# Patient Record
Sex: FEMALE | Race: BLACK OR AFRICAN AMERICAN | NOT HISPANIC OR LATINO | Employment: FULL TIME | URBAN - METROPOLITAN AREA
[De-identification: names, ages, dates, MRNs, and addresses within clinical notes are randomized per-mention and may not be internally consistent; named-entity substitution may affect disease eponyms.]

---

## 2018-02-22 ENCOUNTER — HOSPITAL ENCOUNTER (EMERGENCY)
Facility: HOSPITAL | Age: 33
Discharge: HOME | End: 2018-02-22
Attending: EMERGENCY MEDICINE | Admitting: EMERGENCY MEDICINE
Payer: COMMERCIAL

## 2018-02-22 VITALS
DIASTOLIC BLOOD PRESSURE: 71 MMHG | HEART RATE: 89 BPM | OXYGEN SATURATION: 99 % | WEIGHT: 138 LBS | SYSTOLIC BLOOD PRESSURE: 116 MMHG | TEMPERATURE: 98.7 F | RESPIRATION RATE: 16 BRPM

## 2018-02-22 DIAGNOSIS — R42 EPISODIC LIGHTHEADEDNESS: ICD-10-CM

## 2018-02-22 DIAGNOSIS — R09.81 SINUS CONGESTION: ICD-10-CM

## 2018-02-22 DIAGNOSIS — R11.0 NAUSEA: Primary | ICD-10-CM

## 2018-02-22 LAB
ALBUMIN SERPL BCP-MCNC: 3.5 G/DL (ref 3.5–5)
ALP SERPL-CCNC: 47 U/L (ref 46–116)
ALT SERPL W P-5'-P-CCNC: 20 U/L (ref 12–78)
ANION GAP SERPL CALCULATED.3IONS-SCNC: 9 MMOL/L (ref 4–13)
AST SERPL W P-5'-P-CCNC: 17 U/L (ref 5–45)
BACTERIA UR QL AUTO: ABNORMAL /HPF
BASOPHILS # BLD AUTO: 0.02 THOUSANDS/ΜL (ref 0–0.1)
BASOPHILS NFR BLD AUTO: 1 % (ref 0–1)
BILIRUB DIRECT SERPL-MCNC: 0.1 MG/DL (ref 0–0.2)
BILIRUB SERPL-MCNC: 0.32 MG/DL (ref 0.2–1)
BILIRUB UR QL STRIP: NEGATIVE
BUN SERPL-MCNC: 9 MG/DL (ref 5–25)
CALCIUM SERPL-MCNC: 8.3 MG/DL (ref 8.3–10.1)
CHLORIDE SERPL-SCNC: 104 MMOL/L (ref 100–108)
CLARITY UR: CLEAR
CO2 SERPL-SCNC: 27 MMOL/L (ref 21–32)
COLOR UR: YELLOW
CREAT SERPL-MCNC: 0.76 MG/DL (ref 0.6–1.3)
EOSINOPHIL # BLD AUTO: 0.01 THOUSAND/ΜL (ref 0–0.61)
EOSINOPHIL NFR BLD AUTO: 0 % (ref 0–6)
ERYTHROCYTE [DISTWIDTH] IN BLOOD BY AUTOMATED COUNT: 13.3 % (ref 11.6–15.1)
EXT PREG TEST URINE: NORMAL
GFR SERPL CREATININE-BSD FRML MDRD: 122 ML/MIN/1.73SQ M
GLUCOSE SERPL-MCNC: 71 MG/DL (ref 65–140)
GLUCOSE UR STRIP-MCNC: NEGATIVE MG/DL
HCT VFR BLD AUTO: 33.8 % (ref 34.8–46.1)
HGB BLD-MCNC: 11.6 G/DL (ref 11.5–15.4)
HGB UR QL STRIP.AUTO: ABNORMAL
KETONES UR STRIP-MCNC: NEGATIVE MG/DL
LEUKOCYTE ESTERASE UR QL STRIP: ABNORMAL
LYMPHOCYTES # BLD AUTO: 1.45 THOUSANDS/ΜL (ref 0.6–4.47)
LYMPHOCYTES NFR BLD AUTO: 52 % (ref 14–44)
MCH RBC QN AUTO: 28.2 PG (ref 26.8–34.3)
MCHC RBC AUTO-ENTMCNC: 34.3 G/DL (ref 31.4–37.4)
MCV RBC AUTO: 82 FL (ref 82–98)
MONOCYTES # BLD AUTO: 0.41 THOUSAND/ΜL (ref 0.17–1.22)
MONOCYTES NFR BLD AUTO: 15 % (ref 4–12)
NEUTROPHILS # BLD AUTO: 0.88 THOUSANDS/ΜL (ref 1.85–7.62)
NEUTS SEG NFR BLD AUTO: 32 % (ref 43–75)
NITRITE UR QL STRIP: NEGATIVE
NON-SQ EPI CELLS URNS QL MICRO: ABNORMAL /HPF
NRBC BLD AUTO-RTO: 0 /100 WBCS
PH UR STRIP.AUTO: 7 [PH] (ref 4.5–8)
PLATELET # BLD AUTO: 172 THOUSANDS/UL (ref 149–390)
PMV BLD AUTO: 10.1 FL (ref 8.9–12.7)
POTASSIUM SERPL-SCNC: 3.4 MMOL/L (ref 3.5–5.3)
PROT SERPL-MCNC: 7.2 G/DL (ref 6.4–8.2)
PROT UR STRIP-MCNC: NEGATIVE MG/DL
RBC # BLD AUTO: 4.12 MILLION/UL (ref 3.81–5.12)
RBC #/AREA URNS AUTO: ABNORMAL /HPF
SODIUM SERPL-SCNC: 140 MMOL/L (ref 136–145)
SP GR UR STRIP.AUTO: 1.02 (ref 1–1.03)
UROBILINOGEN UR QL STRIP.AUTO: 1 E.U./DL
WBC # BLD AUTO: 2.77 THOUSAND/UL (ref 4.31–10.16)
WBC #/AREA URNS AUTO: ABNORMAL /HPF

## 2018-02-22 PROCEDURE — 80076 HEPATIC FUNCTION PANEL: CPT | Performed by: EMERGENCY MEDICINE

## 2018-02-22 PROCEDURE — 36415 COLL VENOUS BLD VENIPUNCTURE: CPT | Performed by: EMERGENCY MEDICINE

## 2018-02-22 PROCEDURE — 81002 URINALYSIS NONAUTO W/O SCOPE: CPT | Performed by: EMERGENCY MEDICINE

## 2018-02-22 PROCEDURE — 96361 HYDRATE IV INFUSION ADD-ON: CPT

## 2018-02-22 PROCEDURE — 99284 EMERGENCY DEPT VISIT MOD MDM: CPT

## 2018-02-22 PROCEDURE — 81001 URINALYSIS AUTO W/SCOPE: CPT

## 2018-02-22 PROCEDURE — 85025 COMPLETE CBC W/AUTO DIFF WBC: CPT | Performed by: EMERGENCY MEDICINE

## 2018-02-22 PROCEDURE — 81025 URINE PREGNANCY TEST: CPT | Performed by: EMERGENCY MEDICINE

## 2018-02-22 PROCEDURE — 80048 BASIC METABOLIC PNL TOTAL CA: CPT | Performed by: EMERGENCY MEDICINE

## 2018-02-22 PROCEDURE — 96374 THER/PROPH/DIAG INJ IV PUSH: CPT

## 2018-02-22 RX ORDER — OXYMETAZOLINE HYDROCHLORIDE 0.05 G/100ML
2 SPRAY NASAL ONCE
Status: COMPLETED | OUTPATIENT
Start: 2018-02-22 | End: 2018-02-22

## 2018-02-22 RX ORDER — ONDANSETRON 4 MG/1
4 TABLET, ORALLY DISINTEGRATING ORAL EVERY 6 HOURS PRN
Qty: 20 TABLET | Refills: 0 | Status: SHIPPED | OUTPATIENT
Start: 2018-02-22 | End: 2019-11-01

## 2018-02-22 RX ORDER — ONDANSETRON 2 MG/ML
4 INJECTION INTRAMUSCULAR; INTRAVENOUS ONCE
Status: COMPLETED | OUTPATIENT
Start: 2018-02-22 | End: 2018-02-22

## 2018-02-22 RX ADMIN — OXYMETAZOLINE HYDROCHLORIDE 2 SPRAY: 5 SPRAY NASAL at 21:56

## 2018-02-22 RX ADMIN — SODIUM CHLORIDE 1000 ML: 0.9 INJECTION, SOLUTION INTRAVENOUS at 21:57

## 2018-02-22 RX ADMIN — ONDANSETRON 4 MG: 2 INJECTION INTRAMUSCULAR; INTRAVENOUS at 21:57

## 2018-02-23 NOTE — DISCHARGE INSTRUCTIONS
Make sure to drink plenty of fluids to avoid dehydration  Take the zofran as needed for nausea, this can be placed under the tongue to dissolve if you are vomiting  The number for the [facility] has been included in these discharge instructions, you should call to establish continuing medical care  You can use the Afrin for 2 days, after this is can start causing worsening symptoms  Continue to use your Flonase  Dizziness   WHAT YOU NEED TO KNOW:   Dizziness is a feeling of being off balance or unsteady  Common causes of dizziness are an inner ear fluid imbalance or a lack of oxygen in your blood  Dizziness may be acute (lasts 3 days or less) or chronic (lasts longer than 3 days)  You may have dizzy spells that last from seconds to a few hours  DISCHARGE INSTRUCTIONS:   Return to the emergency department if:   · You have a headache and a stiff neck  · You have shaking chills and a fever  · You vomit over and over with no relief  · Your vomit or bowel movements are red or black  · You have pain in your chest, back, or abdomen  · You have numbness, especially in your face, arms, or legs  · You have trouble moving your arms or legs  · You are confused  Contact your healthcare provider if:   · You have a fever  · Your symptoms do not get better with treatment  · You have questions or concerns about your condition or care  Manage your symptoms:   · Do not drive  or operate heavy machinery when you are dizzy  · Get up slowly  from sitting or lying down  · Drink plenty of liquids  Liquids help prevent dehydration  Ask how much liquid to drink each day and which liquids are best for you  Follow up with your healthcare provider as directed:  Write down your questions so you remember to ask them during your visits     © 2017 [employer] Information is for End User's use only and may not be sold, redistributed or otherwise used for commercial purposes  All illustrations and images included in CareNotes® are the copyrighted property of A D A M [employer]  or Randy Merlos  The above information is an  only  It is not intended as medical advice for individual conditions or treatments  Talk to your doctor, nurse or pharmacist before following any medical regimen to see if it is safe and effective for you

## 2018-02-23 NOTE — ED PROVIDER NOTES
History  Chief Complaint   Patient presents with    Dizziness     dizziness and nausea reported  patient reports with change in movement and waking up symptoms  recent diagnosis of sinus infection and currently on antbiotics  28 YO female presents with dizziness and nausea  Pt states she was recently started on Augmentin for a sinus infection  States nasal discharge and runny nose have not improved  She is now having nausea, general malaise and dizziness  States 2 days ago she woke with vertigo which improved but she continues to have lightheadedness, she denies this at the time  Pt denies coughing, no shortness of breath or chest pain  Pt did not get a flu vaccination this year  Pt denies CP/SOB/F/C/V/D/C, no dysuria, burning on urination or blood in urine  History provided by:  Patient   used: No    Dizziness   Quality:  Lightheadedness  Severity:  Moderate  Onset quality:  Gradual  Duration:  5 days  Timing:  Intermittent  Progression:  Waxing and waning  Chronicity:  New  Context: not with head movement    Relieved by:  Nothing  Worsened by:  Nothing  Ineffective treatments:  None tried  Associated symptoms: no chest pain, no diarrhea, no headaches, no hearing loss, no palpitations, no shortness of breath, no syncope, no vision changes, no vomiting and no weakness        None       History reviewed  No pertinent past medical history  History reviewed  No pertinent surgical history  History reviewed  No pertinent family history  I have reviewed and agree with the history as documented  Social History   Substance Use Topics    Smoking status: Never Smoker    Smokeless tobacco: Never Used    Alcohol use No        Review of Systems   Constitutional: Negative for chills, fatigue and fever  HENT: Negative for dental problem and hearing loss  Eyes: Negative for visual disturbance  Respiratory: Negative for shortness of breath      Cardiovascular: Negative for chest pain, palpitations and syncope  Gastrointestinal: Negative for abdominal pain, diarrhea and vomiting  Genitourinary: Negative for dysuria and frequency  Musculoskeletal: Negative for arthralgias  Skin: Negative for rash  Neurological: Positive for dizziness  Negative for weakness, light-headedness and headaches  Psychiatric/Behavioral: Negative for agitation, behavioral problems and confusion  All other systems reviewed and are negative  Physical Exam  ED Triage Vitals   Temperature Pulse Respirations Blood Pressure SpO2   02/22/18 2119 02/22/18 2119 02/22/18 2119 02/22/18 2121 02/22/18 2119   98 7 °F (37 1 °C) 97 20 138/58 100 %      Temp src Heart Rate Source Patient Position - Orthostatic VS BP Location FiO2 (%)   -- 02/22/18 2119 02/22/18 2339 02/22/18 2119 --    Monitor Sitting Right arm       Pain Score       02/22/18 2119       1           Orthostatic Vital Signs  Vitals:    02/22/18 2119 02/22/18 2121 02/22/18 2339   BP:  138/58 116/71   Pulse: 97  89   Patient Position - Orthostatic VS:   Sitting       Physical Exam   Constitutional: She is oriented to person, place, and time  She appears well-developed and well-nourished  HENT:   Head: Normocephalic and atraumatic  Eyes: EOM are normal    Neck: Normal range of motion  Cardiovascular: Normal rate, regular rhythm and normal heart sounds  Pulmonary/Chest: Effort normal and breath sounds normal    Abdominal: Soft  There is no tenderness  Musculoskeletal: Normal range of motion  Neurological: She is alert and oriented to person, place, and time  Skin: Skin is warm and dry  Psychiatric: She has a normal mood and affect  Her behavior is normal  Thought content normal    Nursing note and vitals reviewed        ED Medications  Medications   sodium chloride 0 9 % bolus 1,000 mL (0 mL Intravenous Stopped 2/22/18 2315)   ondansetron (ZOFRAN) injection 4 mg (4 mg Intravenous Given 2/22/18 2157)   oxymetazoline (AFRIN) 0 05 % nasal spray 2 spray (2 sprays Each Nare Given 2/22/18 2156)       Diagnostic Studies  Results Reviewed     Procedure Component Value Units Date/Time    Urine Microscopic [[id number]]  (Abnormal) Collected:  02/22/18 2303    Lab Status:  Final result Specimen:  Urine from Urine, Clean Catch Updated:  02/22/18 2325     RBC, UA 0-1 (A) /hpf      WBC, UA 1-2 (A) /hpf      Epithelial Cells Innumerable (A) /hpf      Bacteria, UA Occasional /hpf     POCT urinalysis dipstick [[id number]]  (Abnormal) Resulted:  02/22/18 2304    Lab Status:  Final result Specimen:  Urine Updated:  02/22/18 2304    POCT pregnancy, urine [[id number]]  (Normal) Resulted:  02/22/18 2303    Lab Status:  Final result Updated:  02/22/18 2304     EXT PREG TEST UR (Ref: Negative) negative(-)    ED Urine Macroscopic [[id number]]  (Abnormal) Collected:  02/22/18 2303    Lab Status:  Final result Specimen:  Urine Updated:  02/22/18 2302     Color, UA Yellow     Clarity, UA Clear     pH, UA 7 0     Leukocytes, UA Large (A)     Nitrite, UA Negative     Protein, UA Negative mg/dl      Glucose, UA Negative mg/dl      Ketones, UA Negative mg/dl      Urobilinogen, UA 1 0 E U /dl      Bilirubin, UA Negative     Blood, UA Small (A)     Specific [place], UA 1 020    Narrative:       CLINITEK RESULT    Basic metabolic panel [[id number]]  (Abnormal) Collected:  02/22/18 2155    Lab Status:  Final result Specimen:  Blood from Arm, Right Updated:  02/22/18 2244     Sodium 140 mmol/L      Potassium 3 4 (L) mmol/L      Chloride 104 mmol/L      CO2 27 mmol/L      Anion Gap 9 mmol/L      BUN 9 mg/dL      Creatinine 0 76 mg/dL      Glucose 71 mg/dL      Calcium 8 3 mg/dL      eGFR 122 ml/min/1 73sq m     Narrative:         National Kidney Disease Education Program recommendations are as follows:  GFR calculation is accurate only with a steady state creatinine  Chronic Kidney disease less than 60 ml/min/1 73 sq  meters  Kidney failure less than 15 ml/min/1 73 sq  meters      Hepatic function panel [[id number]]  (Normal) Collected:  02/22/18 2155    Lab Status:  Final result Specimen:  Blood from Arm, Right Updated:  02/22/18 2244     Total Bilirubin 0 32 mg/dL      Bilirubin, Direct 0 10 mg/dL      Alkaline Phosphatase 47 U/L      AST 17 U/L      ALT 20 U/L      Total Protein 7 2 g/dL      Albumin 3 5 g/dL     CBC and differential [[id number]]  (Abnormal) Collected:  02/22/18 2155    Lab Status:  Final result Specimen:  Blood from Arm, Right Updated:  02/22/18 2218     WBC 2 77 (L) Thousand/uL      RBC 4 12 Million/uL      Hemoglobin 11 6 g/dL      Hematocrit 33 8 (L) %      MCV 82 fL      MCH 28 2 pg      MCHC 34 3 g/dL      RDW 13 3 %      MPV 10 1 fL      Platelets 890 Thousands/uL      nRBC 0 /100 WBCs      Neutrophils Relative 32 (L) %      Lymphocytes Relative 52 (H) %      Monocytes Relative 15 (H) %      Eosinophils Relative 0 %      Basophils Relative 1 %      Neutrophils Absolute 0 88 (L) Thousands/µL      Lymphocytes Absolute 1 45 Thousands/µL      Monocytes Absolute 0 41 Thousand/µL      Eosinophils Absolute 0 01 Thousand/µL      Basophils Absolute 0 02 Thousands/µL                  No orders to display              Procedures  Procedures       Phone Contacts  ED Phone Contact    ED Course  ED Course as of Feb 23 1404   Thu Feb 22, 2018   2258 Pt states she currently feels better, able to ambulate without lightheadedness  MDM  Number of Diagnoses or Management Options  Episodic lightheadedness: new and does not require workup  Nausea: new and does not require workup  Sinus congestion: new and does not require workup  Diagnosis management comments: 1  Lightheadedness - pt with sinus infection, nausea, general malaise, not improvement with Abx  Now with lightheadedness intermittently, currently no lightheadedness  Will check electrolytes for hydration status, CBC for anemia  Will give fluids, anti-emetics, try Afrin for sinus congestion and reassess  Amount and/or Complexity of Data Reviewed  Clinical lab tests: ordered and reviewed  Tests in the radiology section of CPT®: ordered and reviewed  Independent visualization of images, tracings, or specimens: yes    Patient Progress  Patient progress: improved    CritCare Time    Disposition  Final diagnoses:   Nausea   Sinus congestion   Episodic lightheadedness     Time reflects when diagnosis was documented in both MDM as applicable and the Disposition within this note     Time User Action Codes Description Comment    2/22/2018 11:21 PM Viktoria Apley E Add [R11 0] Nausea     2/22/2018 11:21 PM Viktoria Apley E Add [R09 81] Sinus congestion     2/22/2018 11:21 PM Viktoria Apley E Add [R42] Episodic lightheadedness       ED Disposition     ED Disposition Condition Comment    Discharge  Elaine Flowers discharge to home/self care  Condition at discharge: Improved      Follow-up Information    None       Discharge Medication List as of 2/22/2018 11:32 PM      START taking these medications    Details   ondansetron (ZOFRAN-ODT) 4 mg disintegrating tablet Take 1 tablet (4 mg total) by mouth every 6 (six) hours as needed for nausea or vomiting, Starting Thu 2/22/2018, Print           No discharge procedures on file      ED Provider  Electronically Signed by           Isaac Kaplan MD  02/23/18 1523

## 2018-05-18 ENCOUNTER — HOSPITAL ENCOUNTER (EMERGENCY)
Facility: HOSPITAL | Age: 33
Discharge: HOME | End: 2018-05-18
Attending: EMERGENCY MEDICINE | Admitting: EMERGENCY MEDICINE
Payer: COMMERCIAL

## 2018-05-18 VITALS
RESPIRATION RATE: 18 BRPM | OXYGEN SATURATION: 98 % | TEMPERATURE: 98.5 F | DIASTOLIC BLOOD PRESSURE: 69 MMHG | HEART RATE: 89 BPM | WEIGHT: 154.6 LBS | SYSTOLIC BLOOD PRESSURE: 104 MMHG

## 2018-05-18 DIAGNOSIS — J02.9 PHARYNGITIS, ACUTE: Primary | ICD-10-CM

## 2018-05-18 LAB — S PYO AG THROAT QL: NEGATIVE

## 2018-05-18 PROCEDURE — 87070 CULTURE OTHR SPECIMN AEROBIC: CPT | Performed by: PHYSICIAN ASSISTANT

## 2018-05-18 PROCEDURE — 99283 EMERGENCY DEPT VISIT LOW MDM: CPT

## 2018-05-18 PROCEDURE — 87430 STREP A AG IA: CPT | Performed by: PHYSICIAN ASSISTANT

## 2018-05-18 RX ORDER — ACETAMINOPHEN 325 MG/1
975 TABLET ORAL ONCE
Status: COMPLETED | OUTPATIENT
Start: 2018-05-18 | End: 2018-05-18

## 2018-05-18 RX ADMIN — ACETAMINOPHEN 975 MG: 325 TABLET ORAL at 14:36

## 2018-05-18 NOTE — ED PROVIDER NOTES
History  Chief Complaint   Patient presents with    Sore Throat     sore throat since last night   headache since last night  Took ibuprofen w/some relief this morning at 0900  Patient is a 29-year-old female who reports developing sore throat and headache last night  Patient denies dizziness, vision change  She has been taking ibuprofen with some relief  She denies runny nose, congestion and cough  Further denies fevers, chills, muscle aches or joint aches  No chest pain, shortness of breath, abdominal pain, nausea, vomiting, diarrhea  ROS:  Gen: no fevers, chills, fatigue  Eyes: no redness, pain, vision change, discharge  ENT: no runny nose, no congestion, no sinus pain, + sore throat  Heart: no chest pain, palpitations  Lungs: no cough, no chest tightness, no wheezing, no sob  GI: no abdominal pain, no nausea, no vomiting, no diarrhea  Gu: no dysuria  Skin: no rash  Neuro: no dizziness, +headache, no weakness  Ms: no myalgias, no arthralgias     Physical:  Vitals reviewed  Gen: well nourished, cooperative, comfortable in no acute distress  Eyes: PERRL, EOM's intact, conjunctiva/sclera clear, no discharge  Ears: external ears normal, NTTP, EAC's clear, TM's pearly gray  Nose: no active drainage, turbinates with no inflammation, sinuses NTTP  Mouth: mucous membranes moist, pos pharynx clear with mild erythema, no edema, uvula is midline, no tonsillar hypertrophy, no exudates  Neck: supple, no masses, no lymphadenopathy, FROM intact  Heart: regular rate and rhythm, no murmurs  Lungs: clear bilat, no wheezes, rales, rhonchi, no tachypnea  Abd: soft, +BS, NTTP, no guarding, no rebound  Skin: good skin turgor, no rash  Musc: Non-focal with FROM BL upper/lower extremities, neck, chest and back  Neuro: Non-focal  No sensory deficits noted  No motor deficits noted  Prior to Admission Medications   Prescriptions Last Dose Informant Patient Reported?  Taking?   ondansetron (ZOFRAN-ODT) 4 mg disintegrating tablet   No No   Sig: Take 1 tablet (4 mg total) by mouth every 6 (six) hours as needed for nausea or vomiting      Facility-Administered Medications: None       History reviewed  No pertinent past medical history  History reviewed  No pertinent surgical history  History reviewed  No pertinent family history  I have reviewed and agree with the history as documented  Social History   Substance Use Topics    Smoking status: Never Smoker    Smokeless tobacco: Never Used    Alcohol use No        Review of Systems    Physical Exam  Physical Exam    Vital Signs  ED Triage Vitals [05/18/18 1404]   Temperature Pulse Respirations Blood Pressure SpO2   98 5 °F (36 9 °C) 89 18 104/69 98 %      Temp src Heart Rate Source Patient Position - Orthostatic VS BP Location FiO2 (%)   -- -- -- -- --      Pain Score       6           Vitals:    05/18/18 1404   BP: 104/69   Pulse: 89       Visual Acuity      ED Medications  Medications   acetaminophen (TYLENOL) tablet 975 mg (975 mg Oral Given 5/18/18 1436)       Diagnostic Studies  Results Reviewed     Procedure Component Value Units Date/Time    Rapid Beta strep screen [[id number]]  (Normal) Collected:  05/18/18 1435    Lab Status:  Final result Specimen:  Throat from Throat Updated:  05/18/18 1502     Rapid Strep A Screen Negative    Throat culture [[id number]] Collected:  05/18/18 1435    Lab Status:   In process Specimen:  Throat from Throat Updated:  05/18/18 1502                 No orders to display              Procedures  Procedures       Phone Contacts  ED Phone Contact    ED Course                               MDM  CritCare Time    Disposition  Final diagnoses:   Pharyngitis, acute     Time reflects when diagnosis was documented in both MDM as applicable and the Disposition within this note     Time User Action Codes Description Comment    5/18/2018  3:22 PM Danny CONNELLY Add [J02 9] Pharyngitis, acute       ED Disposition     ED Disposition Condition Comment    Discharge  Pao Aguilar discharge to home/self care  Condition at discharge: Good        Follow-up Information     Follow up With Specialties Details Why Contact Info    [employer]   Recommend call and establish with a family doctor  Schedule appointment for recheck if symptoms are not steadily improving as expected over the next 3-5 days, sooner if symptoms change or worsen or return to ED  [phone]            Patient's Medications   Discharge Prescriptions    No medications on file     No discharge procedures on file      ED Provider  Electronically Signed by           Margie Kohler PA-C  05/18/18 8937

## 2018-05-18 NOTE — DISCHARGE INSTRUCTIONS

## 2018-05-20 LAB — BACTERIA THROAT CULT: NORMAL

## 2019-01-05 ENCOUNTER — HOSPITAL ENCOUNTER (EMERGENCY)
Facility: HOSPITAL | Age: 34
Discharge: HOME | End: 2019-01-05
Attending: EMERGENCY MEDICINE
Payer: COMMERCIAL

## 2019-01-05 VITALS
WEIGHT: 160 LBS | TEMPERATURE: 99 F | OXYGEN SATURATION: 98 % | RESPIRATION RATE: 16 BRPM | SYSTOLIC BLOOD PRESSURE: 117 MMHG | HEART RATE: 113 BPM | DIASTOLIC BLOOD PRESSURE: 78 MMHG

## 2019-01-05 DIAGNOSIS — R51.9 HEADACHE: ICD-10-CM

## 2019-01-05 DIAGNOSIS — M79.10 MYALGIA: ICD-10-CM

## 2019-01-05 DIAGNOSIS — R11.2 NAUSEA AND VOMITING: Primary | ICD-10-CM

## 2019-01-05 DIAGNOSIS — R05.9 COUGH: ICD-10-CM

## 2019-01-05 LAB
BACTERIA UR QL AUTO: ABNORMAL /HPF
BILIRUB UR QL STRIP: NEGATIVE
CLARITY UR: ABNORMAL
COLOR UR: YELLOW
EXT PREG TEST URINE: NEGATIVE
GLUCOSE UR STRIP-MCNC: NEGATIVE MG/DL
HGB UR QL STRIP.AUTO: ABNORMAL
KETONES UR STRIP-MCNC: NEGATIVE MG/DL
LEUKOCYTE ESTERASE UR QL STRIP: NEGATIVE
NITRITE UR QL STRIP: NEGATIVE
NON-SQ EPI CELLS URNS QL MICRO: ABNORMAL /HPF
PH UR STRIP.AUTO: 6 [PH] (ref 4.5–8)
PROT UR STRIP-MCNC: ABNORMAL MG/DL
RBC #/AREA URNS AUTO: ABNORMAL /HPF
SP GR UR STRIP.AUTO: 1.02 (ref 1–1.03)
UROBILINOGEN UR QL STRIP.AUTO: 1 E.U./DL
WBC #/AREA URNS AUTO: ABNORMAL /HPF

## 2019-01-05 PROCEDURE — 96375 TX/PRO/DX INJ NEW DRUG ADDON: CPT

## 2019-01-05 PROCEDURE — 96361 HYDRATE IV INFUSION ADD-ON: CPT

## 2019-01-05 PROCEDURE — 81025 URINE PREGNANCY TEST: CPT | Performed by: EMERGENCY MEDICINE

## 2019-01-05 PROCEDURE — 96374 THER/PROPH/DIAG INJ IV PUSH: CPT

## 2019-01-05 PROCEDURE — 99283 EMERGENCY DEPT VISIT LOW MDM: CPT

## 2019-01-05 PROCEDURE — 81001 URINALYSIS AUTO W/SCOPE: CPT

## 2019-01-05 RX ORDER — ACETAMINOPHEN 325 MG/1
325 TABLET ORAL ONCE
Status: COMPLETED | OUTPATIENT
Start: 2019-01-05 | End: 2019-01-05

## 2019-01-05 RX ORDER — METOCLOPRAMIDE HYDROCHLORIDE 5 MG/ML
10 INJECTION INTRAMUSCULAR; INTRAVENOUS ONCE
Status: COMPLETED | OUTPATIENT
Start: 2019-01-05 | End: 2019-01-05

## 2019-01-05 RX ORDER — METOCLOPRAMIDE 10 MG/1
10 TABLET ORAL EVERY 6 HOURS
Qty: 10 TABLET | Refills: 0 | Status: SHIPPED | OUTPATIENT
Start: 2019-01-05 | End: 2019-11-01

## 2019-01-05 RX ORDER — ACETAMINOPHEN 325 MG/1
650 TABLET ORAL ONCE
Status: COMPLETED | OUTPATIENT
Start: 2019-01-05 | End: 2019-01-05

## 2019-01-05 RX ORDER — KETOROLAC TROMETHAMINE 30 MG/ML
15 INJECTION, SOLUTION INTRAMUSCULAR; INTRAVENOUS ONCE
Status: COMPLETED | OUTPATIENT
Start: 2019-01-05 | End: 2019-01-05

## 2019-01-05 RX ORDER — NAPROXEN 500 MG/1
500 TABLET ORAL 2 TIMES DAILY WITH MEALS
Qty: 10 TABLET | Refills: 0 | Status: SHIPPED | OUTPATIENT
Start: 2019-01-05 | End: 2019-11-01

## 2019-01-05 RX ADMIN — ACETAMINOPHEN 650 MG: 325 TABLET, FILM COATED ORAL at 09:55

## 2019-01-05 RX ADMIN — SODIUM CHLORIDE 1000 ML: 0.9 INJECTION, SOLUTION INTRAVENOUS at 11:22

## 2019-01-05 RX ADMIN — ACETAMINOPHEN 325 MG: 325 TABLET, FILM COATED ORAL at 11:27

## 2019-01-05 RX ADMIN — KETOROLAC TROMETHAMINE 15 MG: 30 INJECTION, SOLUTION INTRAMUSCULAR at 09:56

## 2019-01-05 RX ADMIN — SODIUM CHLORIDE 1000 ML: 0.9 INJECTION, SOLUTION INTRAVENOUS at 13:19

## 2019-01-05 RX ADMIN — SODIUM CHLORIDE 1000 ML: 0.9 INJECTION, SOLUTION INTRAVENOUS at 09:55

## 2019-01-05 RX ADMIN — METOCLOPRAMIDE 10 MG: 5 INJECTION, SOLUTION INTRAMUSCULAR; INTRAVENOUS at 09:57

## 2019-01-05 NOTE — ED NOTES
Pt awake, states she is feeling "a bit" better  Pt taking PO fluids and has drank 2 full cups of water at this time        Smita Ramirez RN  01/05/19 540 Anthony Coe RN  01/05/19 1257

## 2019-01-05 NOTE — ED NOTES
Pt c/o headache and back pain  Medications administered as ordered  Lights dimmed and call bell within reach        Loren Ruano RN  01/05/19 0531

## 2019-01-05 NOTE — DISCHARGE INSTRUCTIONS
Influenza   WHAT YOU NEED TO KNOW:   Influenza (the flu) is an infection caused by the influenza virus  The flu is easily spread when an infected person coughs, sneezes, or has close contact with others  You may be able to spread the flu to others for 1 week or longer after signs or symptoms appear  DISCHARGE INSTRUCTIONS:   Call 911 for any of the following:   · You have trouble breathing, and your lips look purple or blue  · You have a seizure  Return to the emergency department if:   · You are dizzy, or you are urinating less or not at all  · You have a headache with a stiff neck, and you feel tired or confused  · You have new pain or pressure in your chest     · Your symptoms, such as shortness of breath, vomiting, or diarrhea, get worse  · Your symptoms, such as fever and coughing, seem to get better, but then get worse  Contact your healthcare provider if:   · You have new muscle pain or weakness  · You have questions or concerns about your condition or care  Medicines: You may need any of the following:  · Acetaminophen  decreases pain and fever  It is available without a doctor's order  Ask how much to take and how often to take it  Follow directions  Acetaminophen can cause liver damage if not taken correctly  · NSAIDs , such as ibuprofen, help decrease swelling, pain, and fever  This medicine is available with or without a doctor's order  NSAIDs can cause stomach bleeding or kidney problems in certain people  If you take blood thinner medicine, always ask your healthcare provider if NSAIDs are safe for you  Always read the medicine label and follow directions  · Antivirals  help fight a viral infection  · Take your medicine as directed  Contact your healthcare provider if you think your medicine is not helping or if you have side effects  Tell him or her if you are allergic to any medicine  Keep a list of the medicines, vitamins, and herbs you take   Include the amounts, and when and why you take them  Bring the list or the pill bottles to follow-up visits  Carry your medicine list with you in case of an emergency  Rest  as much as you can to help you recover  Drink liquids as directed  to help prevent dehydration  Ask how much liquid to drink each day and which liquids are best for you  Prevent the spread of influenza:   · Wash your hands often  Use soap and water  Wash your hands after you use the bathroom, change a child's diapers, or sneeze  Wash your hands before you prepare or eat food  Use gel hand cleanser when soap and water are not available  Do not touch your eyes, nose, or mouth unless you have washed your hands first            · Cover your mouth when you sneeze or cough  Cough into a tissue or the bend of your arm  · Clean shared items with a germ-killing   Clean table surfaces, doorknobs, and light switches  Do not share towels, silverware, and dishes with people who are sick  Wash bed sheets, towels, silverware, and dishes with soap and water  · Wear a mask  over your mouth and nose if you are sick or are near anyone who is sick  · Stay away from others  if you are sick  · Influenza vaccine  helps prevent influenza (flu)  Everyone older than 6 months should get a yearly influenza vaccine  Get the vaccine as soon as it is available, usually in September or October each year  Follow up with your healthcare provider as directed:  Write down your questions so you remember to ask them during your visits  © 2017 [employer] Information is for End User's use only and may not be sold, redistributed or otherwise used for commercial purposes  All illustrations and images included in CareNotes® are the copyrighted property of [employer]  or Randy Merlos  The above information is an  only  It is not intended as medical advice for individual conditions or treatments   Talk to your doctor, nurse or pharmacist before following any medical regimen to see if it is safe and effective for you

## 2019-01-05 NOTE — ED NOTES
Pt states back pain is relieved but she still has a headache  Pt still has no urge to void urine        Erica Preciado RN  01/05/19 0887

## 2019-01-05 NOTE — ED PROVIDER NOTES
History  Chief Complaint   Patient presents with    Flu Symptoms     Headache since last Saturday, not relieved w/motrin  Taking sudafed, some relief with that  Pressure behind eyes and cough  Body ache/chills  vomiting started this morning  Denies diarrhea  Having frontal HA x 1 week  Yesterday, pt states she suddenly started with chills, body aches, dry cough, fatigue  This AM started vomiting  History provided by:  Patient   used: No    Flu Symptoms   Presenting symptoms: cough, headache, myalgias, nausea and vomiting    Presenting symptoms: no diarrhea, no fever, no rhinorrhea, no shortness of breath and no sore throat    Headaches:     Duration:  1 week    Timing:  Constant    Progression:  Unchanged    Chronicity:  New  Chronicity:  New  Relieved by:  None tried  Worsened by:  Nothing  Ineffective treatments: Motrin  Associated symptoms: chills    Associated symptoms: no ear pain and no congestion    Risk factors: sick contacts        Prior to Admission Medications   Prescriptions Last Dose Informant Patient Reported? Taking?   ondansetron (ZOFRAN-ODT) 4 mg disintegrating tablet   No No   Sig: Take 1 tablet (4 mg total) by mouth every 6 (six) hours as needed for nausea or vomiting      Facility-Administered Medications: None       History reviewed  No pertinent past medical history  History reviewed  No pertinent surgical history  History reviewed  No pertinent family history  I have reviewed and agree with the history as documented  Social History   Substance Use Topics    Smoking status: Never Smoker    Smokeless tobacco: Never Used    Alcohol use Yes      Comment: occasional        Review of Systems   Constitutional: Positive for chills  Negative for fever  HENT: Negative for congestion, ear discharge, ear pain, postnasal drip, rhinorrhea, sinus pressure, sneezing, sore throat and trouble swallowing  Eyes: Negative for discharge and redness  Respiratory: Positive for cough  Negative for shortness of breath  Cardiovascular: Negative for chest pain  Gastrointestinal: Positive for nausea and vomiting  Negative for abdominal pain and diarrhea  Musculoskeletal: Positive for myalgias  Skin: Negative for rash  Neurological: Positive for headaches  All other systems reviewed and are negative  Physical Exam  Physical Exam   Constitutional: She is oriented to person, place, and time  She appears well-developed and well-nourished  Non-toxic appearance  She does not have a sickly appearance  She does not appear ill  HENT:   Head: Normocephalic and atraumatic  Right Ear: Tympanic membrane normal  No drainage  Tympanic membrane is not injected, not erythematous and not bulging  No middle ear effusion  Left Ear: Tympanic membrane normal  No drainage  Tympanic membrane is not injected, not erythematous and not bulging  No middle ear effusion  Nose: Nose normal    Mouth/Throat: Oropharynx is clear and moist  No oropharyngeal exudate  Eyes: Conjunctivae are normal  Right eye exhibits no discharge  Left eye exhibits no discharge  Right conjunctiva is not injected  Left conjunctiva is not injected  Neck: Normal range of motion  Neck supple  No neck rigidity  Cardiovascular: Regular rhythm, normal heart sounds and intact distal pulses  Tachycardia present  Exam reveals no gallop and no friction rub  No murmur heard  Pulmonary/Chest: Effort normal and breath sounds normal  No stridor  No respiratory distress  She has no wheezes  She has no rales  Abdominal: Soft  Bowel sounds are normal  She exhibits no distension  There is no tenderness  There is no rebound and no guarding  Lymphadenopathy:     She has no cervical adenopathy  Neurological: She is alert and oriented to person, place, and time  Skin: Skin is warm and dry  No rash noted  No pallor  Nursing note and vitals reviewed        Vital Signs  ED Triage Vitals Temperature Pulse Respirations Blood Pressure SpO2   01/05/19 0919 01/05/19 0919 01/05/19 0919 01/05/19 0919 01/05/19 0919   100 4 °F (38 °C) (!) 130 18 135/78 100 %      Temp Source Heart Rate Source Patient Position - Orthostatic VS BP Location FiO2 (%)   01/05/19 0919 01/05/19 1255 01/05/19 1107 01/05/19 1107 --   Oral Monitor Lying Right arm       Pain Score       01/05/19 0919       8           Vitals:    01/05/19 1107 01/05/19 1255 01/05/19 1300 01/05/19 1440   BP: 107/57 109/65 109/65 117/78   Pulse: (!) 125 (!) 119 (!) 118 (!) 113   Patient Position - Orthostatic VS: Lying Lying  Lying       Visual Acuity      ED Medications  Medications   sodium chloride 0 9 % bolus 1,000 mL (0 mL Intravenous Stopped 1/5/19 1107)   metoclopramide (REGLAN) injection 10 mg (10 mg Intravenous Given 1/5/19 0957)   ketorolac (TORADOL) injection 15 mg (15 mg Intravenous Given 1/5/19 0956)   acetaminophen (TYLENOL) tablet 650 mg (650 mg Oral Given 1/5/19 0955)   sodium chloride 0 9 % bolus 1,000 mL (0 mL Intravenous Stopped 1/5/19 1219)   acetaminophen (TYLENOL) tablet 325 mg (325 mg Oral Given 1/5/19 1127)   sodium chloride 0 9 % bolus 1,000 mL (0 mL Intravenous Stopped 1/5/19 1440)       Diagnostic Studies  Results Reviewed     Procedure Component Value Units Date/Time    Urine Microscopic [[id number]]  (Abnormal) Collected:  01/05/19 0944    Lab Status:  Final result Specimen:  Urine from Urine, Clean Catch Updated:  01/05/19 1028     RBC, UA 1-2 (A) /hpf      WBC, UA None Seen /hpf      Epithelial Cells Occasional /hpf      Bacteria, UA Occasional /hpf     POCT pregnancy, urine [[id number]]  (Normal) Resulted:  01/05/19 0930    Lab Status:  Final result Updated:  01/05/19 0930     EXT PREG TEST UR (Ref: Negative) Negative    POCT urinalysis dipstick [[id number]]  (Abnormal) Resulted:  01/05/19 0930    Lab Status:  Final result Specimen:  Urine Updated:  01/05/19 0930    ED Urine Macroscopic [[id number]]  (Abnormal) Collected: 01/05/19 0944    Lab Status:  Final result Specimen:  Urine Updated:  01/05/19 0928     Color, UA Yellow     Clarity, UA Cloudy     pH, UA 6 0     Leukocytes, UA Negative     Nitrite, UA Negative     Protein, UA 30 (1+) (A) mg/dl      Glucose, UA Negative mg/dl      Ketones, UA Negative mg/dl      Urobilinogen, UA 1 0 E U /dl      Bilirubin, UA Negative     Blood, UA Moderate (A)     Specific [place], UA 1 025    Narrative:       CLINITEK RESULT                 No orders to display              Procedures  Procedures       Phone Contacts  ED Phone Contact    ED Course  ED Course as of Jan 05 1545   Sat Jan 05, 2019   1100 Pt states she feels better  Discussed likely viral illness and symptomatic tx     1449 Improved from 130 Pulse: (!) 113                               MDM  Number of Diagnoses or Management Options     Amount and/or Complexity of Data Reviewed  Tests in the medicine section of CPT®: ordered and reviewed      CritCare Time    Disposition  Final diagnoses:   Nausea and vomiting   Headache   Myalgia   Cough     Time reflects when diagnosis was documented in both MDM as applicable and the Disposition within this note     Time User Action Codes Description Comment    1/5/2019 11:01 AM Ashley Madrigal Add [R11 2] Nausea and vomiting     1/5/2019 11:01 AM Ashley Madrigal Add [R51] Headache     1/5/2019 11:01 AM Rohan,  [place] Add [M79 10] Myalgia     1/5/2019 11:01 AM Ashley Madrigal Add [R05] Cough       ED Disposition     ED Disposition Condition Comment    Discharge  Kern Clapper discharge to home/self care      Condition at discharge: Good        Follow-up Information    None         Discharge Medication List as of 1/5/2019  2:49 PM      START taking these medications    Details   metoclopramide (REGLAN) 10 mg tablet Take 1 tablet (10 mg total) by mouth every 6 (six) hours, Starting Sat 1/5/2019, Print      naproxen (NAPROSYN) 500 mg tablet Take 1 tablet (500 mg total) by mouth 2 (two) times a day with meals, Starting Sat 1/5/2019, Print         CONTINUE these medications which have NOT CHANGED    Details   ondansetron (ZOFRAN-ODT) 4 mg disintegrating tablet Take 1 tablet (4 mg total) by mouth every 6 (six) hours as needed for nausea or vomiting, Starting Thu 2/22/2018, Print           No discharge procedures on file      ED Provider  Electronically Signed by           Beatriz Long, DO  01/05/19 2633

## 2019-11-01 ENCOUNTER — APPOINTMENT (EMERGENCY)
Dept: RADIOLOGY | Facility: HOSPITAL | Age: 34
End: 2019-11-01
Payer: COMMERCIAL

## 2019-11-01 ENCOUNTER — HOSPITAL ENCOUNTER (EMERGENCY)
Facility: HOSPITAL | Age: 34
Discharge: HOME | End: 2019-11-01
Attending: EMERGENCY MEDICINE | Admitting: EMERGENCY MEDICINE
Payer: COMMERCIAL

## 2019-11-01 VITALS
OXYGEN SATURATION: 98 % | RESPIRATION RATE: 16 BRPM | HEART RATE: 78 BPM | TEMPERATURE: 98.3 F | DIASTOLIC BLOOD PRESSURE: 63 MMHG | WEIGHT: 172.18 LBS | SYSTOLIC BLOOD PRESSURE: 104 MMHG

## 2019-11-01 DIAGNOSIS — M25.532 LEFT WRIST PAIN: Primary | ICD-10-CM

## 2019-11-01 PROCEDURE — 99283 EMERGENCY DEPT VISIT LOW MDM: CPT

## 2019-11-01 PROCEDURE — 99284 EMERGENCY DEPT VISIT MOD MDM: CPT | Performed by: EMERGENCY MEDICINE

## 2019-11-01 PROCEDURE — 73110 X-RAY EXAM OF WRIST: CPT

## 2019-11-01 PROCEDURE — 73130 X-RAY EXAM OF HAND: CPT

## 2019-11-01 NOTE — ED PROVIDER NOTES
History  Chief Complaint   Patient presents with    Wrist Pain     prt c/o left wrist pain for the past x2 months; pt stated she was lifting somthing at work and pain started after that; pt denies any other concerns     30 y o  F p/w left wrist pain x 2 months  Lifted racks at work 2 months ago and having pain since then  Didn't attempt to see PCP    "I always have pain, but sometimes it gets worse depending if I lift something or turn my wrist " Pain is at ulnar side of left wrist and will radiate up left pinkie  Denies neuro deficits  History provided by:  Patient   used: No    Wrist Pain   Location:  Left wrist  Quality:  Radiates to left pinkie finger  Duration:  2 months  Timing:  Constant  Progression:  Waxing and waning  Chronicity:  New  Worsened by:  Picking things up, certain movements  Associated symptoms: no rash        None       History reviewed  No pertinent past medical history  Past Surgical History:   Procedure Laterality Date    NO PAST SURGERIES         History reviewed  No pertinent family history  I have reviewed and agree with the history as documented  Social History     Tobacco Use    Smoking status: Never Smoker    Smokeless tobacco: Never Used   Substance Use Topics    Alcohol use: Yes     Comment: occasional    Drug use: No        Review of Systems   Musculoskeletal:        Left wrist pain   Skin: Negative for rash and wound  Neurological: Negative for weakness and numbness  All other systems reviewed and are negative  Physical Exam  Physical Exam   Constitutional: She appears well-developed and well-nourished  No distress  HENT:   Head: Normocephalic and atraumatic  Cardiovascular: Intact distal pulses  Musculoskeletal:        Left wrist: Normal         Left hand: Normal    Neurological: She is alert  She has normal strength  Skin: Skin is warm and dry  Capillary refill takes less than 2 seconds  No rash noted   She is not diaphoretic  No erythema  No pallor  Psychiatric: She has a normal mood and affect  Nursing note and vitals reviewed  Vital Signs  ED Triage Vitals [11/01/19 0756]   Temperature Pulse Respirations Blood Pressure SpO2   98 3 °F (36 8 °C) 78 16 104/63 98 %      Temp Source Heart Rate Source Patient Position - Orthostatic VS BP Location FiO2 (%)   Temporal Monitor Sitting Right arm --      Pain Score       3           Vitals:    11/01/19 0756   BP: 104/63   Pulse: 78   Patient Position - Orthostatic VS: Sitting         Visual Acuity      ED Medications  Medications - No data to display    Diagnostic Studies  Results Reviewed     None                 XR wrist 3+ views LEFT   ED Interpretation by Herman Helms DO (11/01 6249)   No fracture      Final Result by Luigi Siegel MD (11/01 1300)      No acute osseous abnormality  Workstation performed: [id number]         XR hand 3+ views LEFT   ED Interpretation by Herman Helms DO (11/01 0919)   No fracture      Final Result by Luigi Siegel MD (11/01 1002)      No acute osseous abnormality  Workstation performed: [id number]                    Procedures  Procedures       ED Course  ED Course as of Nov 01 1008 Fri Nov 01, 2019   0919 Pt updated on results and instructed her to f/u with sports med for further evaluation of wrist pain                                    MDM  Number of Diagnoses or Management Options     Amount and/or Complexity of Data Reviewed  Tests in the radiology section of CPT®: ordered and reviewed        Disposition  Final diagnoses:   Left wrist pain     Time reflects when diagnosis was documented in both MDM as applicable and the Disposition within this note     Time User Action Codes Description Comment    11/1/2019  9:19 AM Jonathan Madrigal Add [M25 532] Left wrist pain       ED Disposition     ED Disposition Condition Date/Time Comment    Discharge Stable Fri Nov 1, 2019  9:19 AM [place] Bryna Fabry discharge to home/self care             Follow-up Information     Follow up With Specialties Details Why [address]  Schedule an appointment as soon as possible for a visit  For your wrist pain [address] [facility]  [phone]          There are no discharge medications for this patient  No discharge procedures on file      ED Provider  Electronically Signed by           Beatriz Rosas 24, DO  11/01/19 1005

## 2020-11-06 ENCOUNTER — HOSPITAL ENCOUNTER (EMERGENCY)
Facility: HOSPITAL | Age: 35
Discharge: HOME | End: 2020-11-06
Attending: EMERGENCY MEDICINE
Payer: COMMERCIAL

## 2020-11-06 VITALS
DIASTOLIC BLOOD PRESSURE: 71 MMHG | TEMPERATURE: 98 F | SYSTOLIC BLOOD PRESSURE: 104 MMHG | HEART RATE: 88 BPM | OXYGEN SATURATION: 98 % | RESPIRATION RATE: 18 BRPM | WEIGHT: 174.16 LBS

## 2020-11-06 DIAGNOSIS — R51.9 HEADACHE: ICD-10-CM

## 2020-11-06 DIAGNOSIS — R09.81 SINUS CONGESTION: ICD-10-CM

## 2020-11-06 DIAGNOSIS — R68.89 FLU-LIKE SYMPTOMS: Primary | ICD-10-CM

## 2020-11-06 LAB — SARS-COV-2 RNA RESP QL NAA+PROBE: POSITIVE

## 2020-11-06 PROCEDURE — 87635 SARS-COV-2 COVID-19 AMP PRB: CPT | Performed by: PHYSICIAN ASSISTANT

## 2020-11-06 PROCEDURE — 99283 EMERGENCY DEPT VISIT LOW MDM: CPT

## 2020-11-06 PROCEDURE — 99284 EMERGENCY DEPT VISIT MOD MDM: CPT | Performed by: EMERGENCY MEDICINE

## 2020-11-06 RX ORDER — FLUTICASONE PROPIONATE 50 MCG
1 SPRAY, SUSPENSION (ML) NASAL DAILY
Qty: 16 G | Refills: 0 | Status: SHIPPED | OUTPATIENT
Start: 2020-11-06 | End: 2021-10-11

## 2020-11-06 RX ORDER — BENZONATATE 100 MG/1
100 CAPSULE ORAL 3 TIMES DAILY PRN
Qty: 21 CAPSULE | Refills: 0 | Status: SHIPPED | OUTPATIENT
Start: 2020-11-06 | End: 2021-10-11

## 2020-11-06 RX ORDER — IBUPROFEN 800 MG/1
800 TABLET ORAL EVERY 8 HOURS PRN
COMMUNITY
End: 2021-10-11

## 2020-11-06 RX ORDER — BUTALBITAL, ACETAMINOPHEN AND CAFFEINE 50; 325; 40 MG/1; MG/1; MG/1
1 TABLET ORAL EVERY 6 HOURS PRN
Qty: 20 TABLET | Refills: 0 | Status: SHIPPED | OUTPATIENT
Start: 2020-11-06 | End: 2021-10-11

## 2021-01-25 ENCOUNTER — APPOINTMENT (OUTPATIENT)
Dept: RADIOLOGY | Age: 36
End: 2021-01-25
Payer: COMMERCIAL

## 2021-01-25 ENCOUNTER — OFFICE VISIT (OUTPATIENT)
Dept: URGENT CARE | Age: 36
End: 2021-01-25
Payer: COMMERCIAL

## 2021-01-25 VITALS
TEMPERATURE: 98.3 F | HEIGHT: 63 IN | OXYGEN SATURATION: 97 % | WEIGHT: 172 LBS | HEART RATE: 99 BPM | BODY MASS INDEX: 30.48 KG/M2 | RESPIRATION RATE: 20 BRPM

## 2021-01-25 DIAGNOSIS — R06.02 SHORTNESS OF BREATH: Primary | ICD-10-CM

## 2021-01-25 DIAGNOSIS — R06.02 SHORTNESS OF BREATH: ICD-10-CM

## 2021-01-25 PROCEDURE — 99213 OFFICE O/P EST LOW 20 MIN: CPT | Performed by: PHYSICIAN ASSISTANT

## 2021-01-25 PROCEDURE — 71046 X-RAY EXAM CHEST 2 VIEWS: CPT

## 2021-01-25 RX ORDER — ALBUTEROL SULFATE 90 UG/1
2 AEROSOL, METERED RESPIRATORY (INHALATION) EVERY 6 HOURS PRN
Qty: 8.5 G | Refills: 0 | Status: SHIPPED | OUTPATIENT
Start: 2021-01-25 | End: 2021-10-11

## 2021-01-25 NOTE — PROGRESS NOTES
[facility]        NAME: Kaleb Rojo is a 37 y o  female  : 1985    MRN: [id number]  DATE: 2021  TIME: 6:43 PM    Assessment and Plan   Shortness of breath [R06 02]  1  Shortness of breath  XR chest pa & lateral    albuterol (ProAir HFA) 90 mcg/act inhaler         Patient Instructions   Begin using albuterol inhaler as prescribed  Continue to get plenty of rest    Follow up with primary care physician in 3 5 days with continued symptoms  Procedure occurred in the emergency room with numerous symptoms  Follow up with PCP in 3-5 days  Proceed to  ER if symptoms worsen  Chief Complaint     Chief Complaint   Patient presents with    COVID-19     Pt c/o fatigue, nausea and SOBx three days  Neg hx of asthma  Pt in direct contact with coworker who tested positive for Covid-19 today  History of Present Illness       37year old female presents to the office for c/o of fatigue, nausa x3 days  Patient also notes that she has had SOB for the last number of weeks since having a positive COVID test at the beginning of 2020  She states that when she was seen and tested for COVID in November she has sinus congestion, nausea as well as lost of taste and smell  She has been well with no lasting symptoms from her infection other than the intermittent shortness of breath with exertion  She notes that she has been in contact with a coworker who tested positive for COVID recently at her workplace who was tested for [employer] today  She denies any increased in being sedentary, birth control use, history of PE, DVT  She denies history of clotting disorders current  She is able be entering normally  She is voiding normally  She still has her appetite  She denies any continue loss of taste or smell  Denies smoking for respiratory related conditions such as asthma or COPD  Review of Systems   Review of Systems   Constitutional: Positive for fatigue   Negative for appetite change, chills and fever  HENT: Negative for congestion, nosebleeds, postnasal drip, sinus pressure, sinus pain and sore throat  Denies loss of taste/smell    Respiratory: Positive for shortness of breath (with exertion )  Negative for chest tightness and wheezing          + feeling to take deep breath more often    Cardiovascular: Negative for chest pain, palpitations and leg swelling  Gastrointestinal: Positive for nausea  Negative for abdominal pain, diarrhea and vomiting  Genitourinary: Negative  Musculoskeletal: Negative for myalgias  Neurological: Positive for headaches (occasional )  Negative for dizziness and light-headedness           Current Medications       Current Outpatient Medications:     albuterol (ProAir HFA) 90 mcg/act inhaler, Inhale 2 puffs every 6 (six) hours as needed for wheezing, Disp: 8 5 g, Rfl: 0    benzonatate (TESSALON PERLES) 100 mg capsule, Take 1 capsule (100 mg total) by mouth 3 (three) times a day as needed for cough (Patient not taking: Reported on 1/25/2021), Disp: 21 capsule, Rfl: 0    butalbital-acetaminophen-caffeine (FIORICET,ESGIC) -40 mg per tablet, Take 1 tablet by mouth every 6 (six) hours as needed for headaches (Patient not taking: Reported on 1/25/2021), Disp: 20 tablet, Rfl: 0    dextromethorphan-guaifenesin (MUCINEX DM)  MG per 12 hr tablet, Take 1 tablet by mouth every 12 (twelve) hours as needed for cough (Patient not taking: Reported on 1/25/2021), Disp: 14 tablet, Rfl: 0    fluticasone (FLONASE) 50 mcg/act nasal spray, 1 spray into each nostril daily (Patient not taking: Reported on 1/25/2021), Disp: 16 g, Rfl: 0    ibuprofen (MOTRIN) 800 mg tablet, Take 800 mg by mouth every 8 (eight) hours as needed for mild pain, Disp: , Rfl:     Current Allergies     Allergies as of 01/25/2021    (No Known Allergies)            The following portions of the patient's history were reviewed and updated as appropriate: allergies, current medications, past family history, past medical history, past social history, past surgical history and problem list      Past Medical History:   Diagnosis Date    Hx of migraine headaches        Past Surgical History:   Procedure Laterality Date    NO PAST SURGERIES         History reviewed  No pertinent family history  Medications have been verified  Objective   Pulse 99   Temp 98 3 °F (36 8 °C)   Resp 20   Ht 5' 3" (1 6 m)   Wt 78 kg (172 lb)   LMP 01/11/2021   SpO2 97%   BMI 30 47 kg/m²   Patient's last menstrual period was 01/11/2021  Physical Exam     Physical Exam  Vitals signs and nursing note reviewed  Constitutional:       General: She is not in acute distress  Appearance: Normal appearance  She is well-developed  She is not ill-appearing, toxic-appearing or diaphoretic  Comments: Pleasant female  Fatigued-looking    HENT:      Head: Normocephalic and atraumatic  Right Ear: Hearing, tympanic membrane, ear canal and external ear normal       Left Ear: Hearing, tympanic membrane, ear canal and external ear normal       Nose: Mucosal edema present  No rhinorrhea  Right Sinus: No maxillary sinus tenderness or frontal sinus tenderness  Left Sinus: No maxillary sinus tenderness or frontal sinus tenderness  Mouth/Throat:      Lips: Pink  Mouth: Mucous membranes are moist       Dentition: No dental caries  Pharynx: Uvula midline  No oropharyngeal exudate, posterior oropharyngeal erythema or uvula swelling  Tonsils: No tonsillar exudate or tonsillar abscesses  Eyes:      General: Lids are normal  No scleral icterus  Right eye: No discharge  Left eye: No discharge  Conjunctiva/sclera: Conjunctivae normal       Pupils: Pupils are equal, round, and reactive to light  Cardiovascular:      Rate and Rhythm: Normal rate and regular rhythm  Heart sounds: Normal heart sounds, S1 normal and S2 normal  No murmur   No S3 or S4 sounds  Pulmonary:      Effort: Pulmonary effort is normal  No respiratory distress  Breath sounds: Normal breath sounds  No stridor  No decreased breath sounds, wheezing, rhonchi or rales  Comments: There is no conversational dyspnea on exam   Abdominal:      General: Bowel sounds are normal  There is no distension  Palpations: Abdomen is soft  Abdomen is not rigid  Tenderness: There is no abdominal tenderness  There is no guarding  Lymphadenopathy:      Cervical: No cervical adenopathy  Skin:     General: Skin is warm and dry  Coloration: Skin is not pale  Findings: No rash  Neurological:      Mental Status: She is alert  Psychiatric:         Behavior: Behavior is cooperative

## 2021-01-25 NOTE — PATIENT INSTRUCTIONS
Begin using albuterol inhaler as prescribed  Continue to get plenty of rest    Follow up with primary care physician in 3 5 days with continued symptoms  Procedure occurred in the emergency room with numerous symptoms  Shortness of Breath   WHAT YOU NEED TO KNOW:   Shortness of breath is a feeling that you cannot get enough air when you breathe in  You may have this feeling only during activity, or all the time  Your symptoms can range from mild to severe  Shortness of breath may be a sign of a serious health condition that needs immediate care  DISCHARGE INSTRUCTIONS:   Return to the emergency department if:   · Your signs and symptoms are the same or worse within 24 hours of treatment  · The skin over your ribs or on your neck sinks in when you breathe  · You feel confused or dizzy  Contact your healthcare provider if:   · You have new or worsening symptoms  · You have questions or concerns about your condition or care  Medicines:   · Medicines  may be used to treat the cause of your symptoms  You may need medicine to treat a bacterial infection or reduce anxiety  Other medicines may be used to open your airway, reduce swelling, or remove extra fluid  If you have a heart condition, you may need medicine to help your heart beat more strongly or regularly  · Take your medicine as directed  Contact your healthcare provider if you think your medicine is not helping or if you have side effects  Tell him or her if you are allergic to any medicine  Keep a list of the medicines, vitamins, and herbs you take  Include the amounts, and when and why you take them  Bring the list or the pill bottles to follow-up visits  Carry your medicine list with you in case of an emergency  Manage shortness of breath:   · Create an action plan  You and your healthcare provider can work together to create a plan for how to handle shortness of breath   The plan can include daily activities, treatment changes, and what to do if you have severe breathing problems  · Lean forward on your elbows when you sit  This helps your lungs expand and may make it easier to breathe  · Use pursed-lip breathing any time you feel short of breath  Breathe in through your nose and then slowly breathe out through your mouth with your lips slightly puckered  It should take you twice as long to breathe out as it did to breathe in  · Do not smoke  Nicotine and other chemicals in cigarettes and cigars can cause lung damage and make shortness of breath worse  Ask your healthcare provider for information if you currently smoke and need help to quit  E-cigarettes or smokeless tobacco still contain nicotine  Talk to your healthcare provider before you use these products  · Reach or maintain a healthy weight  Your healthcare provider can help you create a safe weight loss plan if you are overweight  · Exercise as directed  Exercise can help your lungs work more easily  Exercise can also help you lose weight if needed  Try to get at least 30 minutes of exercise most days of the week  Follow up with your healthcare provider or specialist as directed:  Write down your questions so you remember to ask them during your visits  © Copyright [address] [place] [address] Drive Information is for End User's use only and may not be sold, redistributed or otherwise used for commercial purposes  All illustrations and images included in CareNotes® are the copyrighted property of [employer]  or [facility]   The above information is an  only  It is not intended as medical advice for individual conditions or treatments  Talk to your doctor, nurse or pharmacist before following any medical regimen to see if it is safe and effective for you

## 2021-05-02 ENCOUNTER — HOSPITAL ENCOUNTER (EMERGENCY)
Facility: HOSPITAL | Age: 36
Discharge: HOME | End: 2021-05-02
Attending: EMERGENCY MEDICINE
Payer: COMMERCIAL

## 2021-05-02 VITALS
DIASTOLIC BLOOD PRESSURE: 68 MMHG | OXYGEN SATURATION: 98 % | SYSTOLIC BLOOD PRESSURE: 125 MMHG | HEART RATE: 65 BPM | TEMPERATURE: 98 F | BODY MASS INDEX: 29.84 KG/M2 | WEIGHT: 168.43 LBS | RESPIRATION RATE: 18 BRPM

## 2021-05-02 DIAGNOSIS — R42 VERTIGO: Primary | ICD-10-CM

## 2021-05-02 LAB
BACTERIA UR QL AUTO: ABNORMAL /HPF
BILIRUB UR QL STRIP: NEGATIVE
CLARITY UR: CLEAR
COLOR UR: YELLOW
EXT PREG TEST URINE: NEGATIVE
EXT. CONTROL ED NAV: NORMAL
GLUCOSE UR STRIP-MCNC: NEGATIVE MG/DL
HGB UR QL STRIP.AUTO: NEGATIVE
KETONES UR STRIP-MCNC: NEGATIVE MG/DL
LEUKOCYTE ESTERASE UR QL STRIP: ABNORMAL
NITRITE UR QL STRIP: NEGATIVE
NON-SQ EPI CELLS URNS QL MICRO: ABNORMAL /HPF
PH UR STRIP.AUTO: 8 [PH] (ref 4.5–8)
PROT UR STRIP-MCNC: NEGATIVE MG/DL
RBC #/AREA URNS AUTO: ABNORMAL /HPF
SP GR UR STRIP.AUTO: 1.02 (ref 1–1.03)
UROBILINOGEN UR QL STRIP.AUTO: 0.2 E.U./DL
WBC #/AREA URNS AUTO: ABNORMAL /HPF

## 2021-05-02 PROCEDURE — 81001 URINALYSIS AUTO W/SCOPE: CPT

## 2021-05-02 PROCEDURE — 96374 THER/PROPH/DIAG INJ IV PUSH: CPT

## 2021-05-02 PROCEDURE — 81025 URINE PREGNANCY TEST: CPT | Performed by: PHYSICIAN ASSISTANT

## 2021-05-02 PROCEDURE — 99284 EMERGENCY DEPT VISIT MOD MDM: CPT

## 2021-05-02 PROCEDURE — 99284 EMERGENCY DEPT VISIT MOD MDM: CPT | Performed by: PHYSICIAN ASSISTANT

## 2021-05-02 PROCEDURE — 96361 HYDRATE IV INFUSION ADD-ON: CPT

## 2021-05-02 RX ORDER — ONDANSETRON 2 MG/ML
4 INJECTION INTRAMUSCULAR; INTRAVENOUS ONCE
Status: COMPLETED | OUTPATIENT
Start: 2021-05-02 | End: 2021-05-02

## 2021-05-02 RX ORDER — ONDANSETRON 4 MG/1
4 TABLET, ORALLY DISINTEGRATING ORAL EVERY 6 HOURS PRN
Qty: 20 TABLET | Refills: 0 | Status: SHIPPED | OUTPATIENT
Start: 2021-05-02 | End: 2021-10-11

## 2021-05-02 RX ORDER — MECLIZINE HCL 12.5 MG/1
25 TABLET ORAL ONCE
Status: COMPLETED | OUTPATIENT
Start: 2021-05-02 | End: 2021-05-02

## 2021-05-02 RX ORDER — MECLIZINE HYDROCHLORIDE 25 MG/1
25 TABLET ORAL 3 TIMES DAILY PRN
Qty: 30 TABLET | Refills: 0 | Status: SHIPPED | OUTPATIENT
Start: 2021-05-02 | End: 2021-10-11

## 2021-05-02 RX ADMIN — SODIUM CHLORIDE 1000 ML: 0.9 INJECTION, SOLUTION INTRAVENOUS at 12:34

## 2021-05-02 RX ADMIN — MECLIZINE 25 MG: 12.5 TABLET ORAL at 12:36

## 2021-05-02 RX ADMIN — ONDANSETRON 4 MG: 2 INJECTION INTRAMUSCULAR; INTRAVENOUS at 12:35

## 2021-05-02 NOTE — ED PROVIDER NOTES
History  Chief Complaint   Patient presents with    Dizziness     Pt reports lightheadedness and nausea starting this morning, denies pain     72-year-old female with a history of migraine headaches presents emergency department for evaluation dizziness, nausea  Patient reports that upon waking this morning, she began to feel as though the room was spinning  She denies any headache, visual changes, chest pain, shortness of breath, abdominal pain, nausea, vomiting, diarrhea, dysuria  History provided by:  Patient and medical records   used: No    Dizziness  Quality:  Room spinning  Severity:  Moderate  Onset quality:  Sudden  Duration:  4 hours  Timing:  Constant  Progression:  Worsening  Chronicity:  New  Context: head movement and physical activity    Relieved by:  Being still and closing eyes  Worsened by: Movement  Associated symptoms: no blood in stool, no chest pain, no headaches, no nausea, no shortness of breath, no syncope, no tinnitus, no vision changes, no vomiting and no weakness    Risk factors: no hx of vertigo, no multiple medications and no new medications        Prior to Admission Medications   Prescriptions Last Dose Informant Patient Reported? Taking?    albuterol (ProAir HFA) 90 mcg/act inhaler   No No   Sig: Inhale 2 puffs every 6 (six) hours as needed for wheezing   benzonatate (TESSALON PERLES) 100 mg capsule   No No   Sig: Take 1 capsule (100 mg total) by mouth 3 (three) times a day as needed for cough   Patient not taking: Reported on 1/25/2021   butalbital-acetaminophen-caffeine (FIORICET,ESGIC) -40 mg per tablet   No No   Sig: Take 1 tablet by mouth every 6 (six) hours as needed for headaches   Patient not taking: Reported on 1/25/2021   dextromethorphan-guaifenesin (MUCINEX DM)  MG per 12 hr tablet   No No   Sig: Take 1 tablet by mouth every 12 (twelve) hours as needed for cough   Patient not taking: Reported on 1/25/2021   fluticasone (FLONASE) 50 mcg/act nasal spray   No No   Si spray into each nostril daily   Patient not taking: Reported on 2021   ibuprofen (MOTRIN) 800 mg tablet   Yes No   Sig: Take 800 mg by mouth every 8 (eight) hours as needed for mild pain      Facility-Administered Medications: None       Past Medical History:   Diagnosis Date    Hx of migraine headaches        Past Surgical History:   Procedure Laterality Date    NO PAST SURGERIES         History reviewed  No pertinent family history  I have reviewed and agree with the history as documented  E-Cigarette/Vaping    E-Cigarette Use Never User      E-Cigarette/Vaping Substances    Nicotine No     THC No     CBD No     Flavoring No     Other No     Unknown No      Social History     Tobacco Use    Smoking status: Never Smoker    Smokeless tobacco: Never Used   Substance Use Topics    Alcohol use: Not Currently     Comment: occasional    Drug use: No       Review of Systems   Constitutional: Negative for chills and fever  HENT: Negative for congestion, ear discharge, ear pain and tinnitus  Eyes: Positive for photophobia  Negative for visual disturbance  Respiratory: Negative for shortness of breath  Cardiovascular: Negative for chest pain and syncope  Gastrointestinal: Negative for blood in stool, nausea and vomiting  Genitourinary: Negative for dysuria  Skin: Negative for rash and wound  Neurological: Positive for dizziness  Negative for syncope, weakness, light-headedness, numbness and headaches  All other systems reviewed and are negative  Physical Exam  Physical Exam  Vitals signs and nursing note reviewed  Constitutional:       General: She is not in acute distress  Appearance: She is well-developed  She is not ill-appearing or toxic-appearing  HENT:      Head: Normocephalic and atraumatic  Right Ear: Hearing and external ear normal       Left Ear: Hearing and external ear normal       Nose: Nose normal  No congestion  Mouth/Throat:      Lips: Pink  No lesions  Tongue: Tongue does not deviate from midline  Eyes:      Extraocular Movements: Extraocular movements intact  Conjunctiva/sclera: Conjunctivae normal       Pupils: Pupils are equal, round, and reactive to light  Neck:      Musculoskeletal: Full passive range of motion without pain and neck supple  Cardiovascular:      Rate and Rhythm: Normal rate and regular rhythm  Heart sounds: Normal heart sounds, S1 normal and S2 normal  No murmur  Pulmonary:      Effort: Pulmonary effort is normal       Breath sounds: Normal breath sounds  No decreased breath sounds, wheezing, rhonchi or rales  Abdominal:      General: Bowel sounds are normal       Palpations: Abdomen is soft  Tenderness: There is no abdominal tenderness  There is no guarding or rebound  Musculoskeletal:      Comments: Moves all four limbs without difficulty, crepitus, swelling, or deformity  Skin:     General: Skin is warm and dry  Capillary Refill: Capillary refill takes 2 to 3 seconds  Findings: No rash  Neurological:      General: No focal deficit present  Mental Status: She is alert and oriented to person, place, and time  GCS: GCS eye subscore is 4  GCS verbal subscore is 5  GCS motor subscore is 6  Cranial Nerves: Cranial nerves are intact  Sensory: Sensation is intact  Motor: Motor function is intact  No pronator drift  Coordination: Coordination is intact  Gait: Gait is intact   Gait normal    Psychiatric:         Mood and Affect: Mood normal          Speech: Speech normal          Vital Signs  ED Triage Vitals [05/02/21 1127]   Temperature Pulse Respirations Blood Pressure SpO2   98 °F (36 7 °C) 65 18 125/68 98 %      Temp Source Heart Rate Source Patient Position - Orthostatic VS BP Location FiO2 (%)   Oral Monitor Sitting Right arm --      Pain Score       No Pain           Vitals:    05/02/21 1127   BP: 125/68   Pulse: 65 Patient Position - Orthostatic VS: Sitting         Visual Acuity  Visual Acuity      Most Recent Value   L Pupil Size (mm)  3   R Pupil Size (mm)  3          ED Medications  Medications   sodium chloride 0 9 % bolus 1,000 mL (1,000 mL Intravenous New Bag 5/2/21 1234)   ondansetron (ZOFRAN) injection 4 mg (4 mg Intravenous Given 5/2/21 1235)   meclizine (ANTIVERT) tablet 25 mg (25 mg Oral Given 5/2/21 1236)       Diagnostic Studies  Results Reviewed     Procedure Component Value Units Date/Time    Urine Microscopic [[id number]]  (Abnormal) Collected: 05/02/21 1224    Lab Status: Final result Specimen: Urine, Clean Catch Updated: 05/02/21 1252     RBC, UA None Seen /hpf      WBC, UA 4-10 /hpf      Epithelial Cells Moderate /hpf      Bacteria, UA Moderate /hpf     POCT urinalysis dipstick [[id number]]  (Abnormal) Resulted: 05/02/21 1241    Lab Status: Final result Specimen: Urine Updated: 05/02/21 1241    POCT pregnancy, urine [[id number]]  (Normal) Resulted: 05/02/21 1241    Lab Status: Final result Updated: 05/02/21 1241     EXT PREG TEST UR (Ref: Negative) negative     Control valid    Urine Macroscopic, POC [[id number]]  (Abnormal) Collected: 05/02/21 1224    Lab Status: Final result Specimen: Urine Updated: 05/02/21 1225     Color, UA Yellow     Clarity, UA Clear     pH, UA 8 0     Leukocytes, UA Small     Nitrite, UA Negative     Protein, UA Negative mg/dl      Glucose, UA Negative mg/dl      Ketones, UA Negative mg/dl      Urobilinogen, UA 0 2 E U /dl      Bilirubin, UA Negative     Blood, UA Negative     Specific Gravity, UA 1 025    Narrative:      CLINITEK RESULT                 No orders to display              Procedures  Procedures         ED Course  ED Course as of May 02 1347   Sun May 02, 2021   1219 Patient seen and examined; patient with dizziness like room spinning upon waking this morning; will order IVF, zofran, meclizine    Normal neuro exam      1226 Nitrite, UA: Negative   1226 Leukocytes, UA(!): Small   1247 PREGNANCY TEST URINE: negative   1338 Patient reports dizziness resolved; keen on discharge  SBIRT 22yo+      Most Recent Value   SBIRT (22 yo +)   In order to provide better care to our patients, we are screening all of our patients for alcohol and drug use  Would it be okay to ask you these screening questions? No Filed at: 05/02/2021 1241                    [facility]  Number of Diagnoses or Management Options  Vertigo:   Diagnosis management comments: 72-year-old female with a history of migraine headaches presents emergency department for evaluation dizziness, nausea  Neuro exam within normal limits  GCS 15, CN 3-12 intact  The management plan was discussed in detail with the patient at bedside and all questions were answered  The prior to discharge, we provided both verbal and written instructions  We discussed with the patient the signs and symptoms for which to return to the emergency department  All questions were answered and patient was comfortable with the plan of care and discharged to home  Instructed the patient to follow up with the primary care provider and/or special as provided and their written instructions  The patient verbalized understanding of our discussion and plan of care, and agrees to return to the Emergency Department for concerns and progression of illness  Disposition  Final diagnoses:   Vertigo     Time reflects when diagnosis was documented in both MDM as applicable and the Disposition within this note     Time User Action Codes Description Comment    5/2/2021  1:39 PM Mikki Nolan Add [R42] Vertigo       ED Disposition     ED Disposition Condition Date/Time Comment    Discharge Stable Sun May 2, 2021  1:39 PM Pao Pulliam discharge to home/self care              Follow-up Information     Follow up With Specialties Details Why Contact Info Additional 3 [facility] Emergency Department Emergency Medicine Go to  If symptoms worsen [address] [id number]  [address] [facility] Emergency Department, [address]  , [place], [place], [address]          Patient's Medications   Discharge Prescriptions    MECLIZINE (ANTIVERT) 25 MG TABLET    Take 1 tablet (25 mg total) by mouth 3 (three) times a day as needed for dizziness       Start Date: 5/2/2021  End Date: --       Order Dose: 25 mg       Quantity: 30 tablet    Refills: 0    ONDANSETRON (ZOFRAN-ODT) 4 MG DISINTEGRATING TABLET    Take 1 tablet (4 mg total) by mouth every 6 (six) hours as needed for nausea or vomiting       Start Date: 5/2/2021  End Date: --       Order Dose: 4 mg       Quantity: 20 tablet    Refills: 0     No discharge procedures on file      PDMP Review     None          ED Provider  Electronically Signed by           Francine Samuels PA-C  05/02/21 1828

## 2021-08-20 ENCOUNTER — HOSPITAL ENCOUNTER (EMERGENCY)
Facility: HOSPITAL | Age: 36
Discharge: LEFT BEFORE DISCHARGE | End: 2021-08-21
Attending: EMERGENCY MEDICINE | Admitting: EMERGENCY MEDICINE
Payer: COMMERCIAL

## 2021-08-20 VITALS
RESPIRATION RATE: 16 BRPM | SYSTOLIC BLOOD PRESSURE: 128 MMHG | BODY MASS INDEX: 28.7 KG/M2 | TEMPERATURE: 98.3 F | WEIGHT: 162.04 LBS | DIASTOLIC BLOOD PRESSURE: 74 MMHG | OXYGEN SATURATION: 98 % | HEART RATE: 95 BPM

## 2021-08-20 DIAGNOSIS — O26.899 ABDOMINAL PAIN IN PREGNANCY: Primary | ICD-10-CM

## 2021-08-20 DIAGNOSIS — R10.9 ABDOMINAL PAIN IN PREGNANCY: Primary | ICD-10-CM

## 2021-08-20 PROCEDURE — 99284 EMERGENCY DEPT VISIT MOD MDM: CPT

## 2021-08-21 ENCOUNTER — APPOINTMENT (EMERGENCY)
Dept: ULTRASOUND IMAGING | Facility: HOSPITAL | Age: 36
End: 2021-08-21

## 2021-08-21 LAB
ALBUMIN SERPL BCP-MCNC: 3.3 G/DL (ref 3.5–5)
ALP SERPL-CCNC: 50 U/L (ref 46–116)
ALT SERPL W P-5'-P-CCNC: 18 U/L (ref 12–78)
AMORPH URATE CRY URNS QL MICRO: ABNORMAL /HPF
ANION GAP SERPL CALCULATED.3IONS-SCNC: 7 MMOL/L (ref 4–13)
AST SERPL W P-5'-P-CCNC: 12 U/L (ref 5–45)
B-HCG SERPL-ACNC: 296.2 MIU/ML
BACTERIA UR QL AUTO: ABNORMAL /HPF
BASOPHILS # BLD AUTO: 0.05 THOUSANDS/ΜL (ref 0–0.1)
BASOPHILS NFR BLD AUTO: 1 % (ref 0–1)
BILIRUB SERPL-MCNC: 0.67 MG/DL (ref 0.2–1)
BILIRUB UR QL STRIP: NEGATIVE
BUN SERPL-MCNC: 15 MG/DL (ref 5–25)
CALCIUM ALBUM COR SERPL-MCNC: 8.8 MG/DL (ref 8.3–10.1)
CALCIUM SERPL-MCNC: 8.2 MG/DL (ref 8.3–10.1)
CHLORIDE SERPL-SCNC: 105 MMOL/L (ref 100–108)
CLARITY UR: CLEAR
CO2 SERPL-SCNC: 25 MMOL/L (ref 21–32)
COLOR UR: YELLOW
CREAT SERPL-MCNC: 0.8 MG/DL (ref 0.6–1.3)
EOSINOPHIL # BLD AUTO: 0.08 THOUSAND/ΜL (ref 0–0.61)
EOSINOPHIL NFR BLD AUTO: 1 % (ref 0–6)
ERYTHROCYTE [DISTWIDTH] IN BLOOD BY AUTOMATED COUNT: 13.1 % (ref 11.6–15.1)
EXT PREG TEST URINE: POSITIVE
EXT. CONTROL ED NAV: ABNORMAL
GFR SERPL CREATININE-BSD FRML MDRD: 111 ML/MIN/1.73SQ M
GLUCOSE SERPL-MCNC: 84 MG/DL (ref 65–140)
GLUCOSE UR STRIP-MCNC: NEGATIVE MG/DL
HCT VFR BLD AUTO: 32.2 % (ref 34.8–46.1)
HGB BLD-MCNC: 11 G/DL (ref 11.5–15.4)
HGB UR QL STRIP.AUTO: NEGATIVE
IMM GRANULOCYTES # BLD AUTO: 0.01 THOUSAND/UL (ref 0–0.2)
IMM GRANULOCYTES NFR BLD AUTO: 0 % (ref 0–2)
KETONES UR STRIP-MCNC: NEGATIVE MG/DL
LEUKOCYTE ESTERASE UR QL STRIP: ABNORMAL
LIPASE SERPL-CCNC: 160 U/L (ref 73–393)
LYMPHOCYTES # BLD AUTO: 2.16 THOUSANDS/ΜL (ref 0.6–4.47)
LYMPHOCYTES NFR BLD AUTO: 31 % (ref 14–44)
MCH RBC QN AUTO: 28.9 PG (ref 26.8–34.3)
MCHC RBC AUTO-ENTMCNC: 34.2 G/DL (ref 31.4–37.4)
MCV RBC AUTO: 85 FL (ref 82–98)
MONOCYTES # BLD AUTO: 0.61 THOUSAND/ΜL (ref 0.17–1.22)
MONOCYTES NFR BLD AUTO: 9 % (ref 4–12)
NEUTROPHILS # BLD AUTO: 4.01 THOUSANDS/ΜL (ref 1.85–7.62)
NEUTS SEG NFR BLD AUTO: 58 % (ref 43–75)
NITRITE UR QL STRIP: NEGATIVE
NON-SQ EPI CELLS URNS QL MICRO: ABNORMAL /HPF
NRBC BLD AUTO-RTO: 0 /100 WBCS
PH UR STRIP.AUTO: 7 [PH] (ref 4.5–8)
PLATELET # BLD AUTO: 287 THOUSANDS/UL (ref 149–390)
PMV BLD AUTO: 9.7 FL (ref 8.9–12.7)
POTASSIUM SERPL-SCNC: 4.1 MMOL/L (ref 3.5–5.3)
PROT SERPL-MCNC: 7.1 G/DL (ref 6.4–8.2)
PROT UR STRIP-MCNC: NEGATIVE MG/DL
RBC # BLD AUTO: 3.8 MILLION/UL (ref 3.81–5.12)
RBC #/AREA URNS AUTO: ABNORMAL /HPF
SODIUM SERPL-SCNC: 137 MMOL/L (ref 136–145)
SP GR UR STRIP.AUTO: >=1.03 (ref 1–1.03)
UROBILINOGEN UR QL STRIP.AUTO: 0.2 E.U./DL
WBC # BLD AUTO: 6.92 THOUSAND/UL (ref 4.31–10.16)
WBC #/AREA URNS AUTO: ABNORMAL /HPF

## 2021-08-21 PROCEDURE — 81025 URINE PREGNANCY TEST: CPT | Performed by: PHYSICIAN ASSISTANT

## 2021-08-21 PROCEDURE — 84702 CHORIONIC GONADOTROPIN TEST: CPT | Performed by: PHYSICIAN ASSISTANT

## 2021-08-21 PROCEDURE — 80053 COMPREHEN METABOLIC PANEL: CPT | Performed by: PHYSICIAN ASSISTANT

## 2021-08-21 PROCEDURE — 99284 EMERGENCY DEPT VISIT MOD MDM: CPT | Performed by: PHYSICIAN ASSISTANT

## 2021-08-21 PROCEDURE — 76815 OB US LIMITED FETUS(S): CPT

## 2021-08-21 PROCEDURE — 81001 URINALYSIS AUTO W/SCOPE: CPT

## 2021-08-21 PROCEDURE — 85025 COMPLETE CBC W/AUTO DIFF WBC: CPT | Performed by: PHYSICIAN ASSISTANT

## 2021-08-21 PROCEDURE — 83690 ASSAY OF LIPASE: CPT | Performed by: PHYSICIAN ASSISTANT

## 2021-08-21 PROCEDURE — 36415 COLL VENOUS BLD VENIPUNCTURE: CPT | Performed by: PHYSICIAN ASSISTANT

## 2021-08-21 RX ORDER — ACETAMINOPHEN 325 MG/1
650 TABLET ORAL ONCE
Status: COMPLETED | OUTPATIENT
Start: 2021-08-21 | End: 2021-08-21

## 2021-08-21 RX ORDER — KETOROLAC TROMETHAMINE 30 MG/ML
15 INJECTION, SOLUTION INTRAMUSCULAR; INTRAVENOUS ONCE
Status: DISCONTINUED | OUTPATIENT
Start: 2021-08-21 | End: 2021-08-21

## 2021-08-21 RX ADMIN — ACETAMINOPHEN 650 MG: 325 TABLET ORAL at 00:57

## 2021-08-21 NOTE — ED NOTES
Contacted US at this time for patient, states they will be in to perform study       Caesar Cooper  08/21/21 0147

## 2021-08-21 NOTE — ED NOTES
Pt came to nurses station requesting to leave  Pt reports she came back from [address] and was told she could leave after  ED PAC Jennifer made aware  Pt willing to sign BEATRIZ Nieto RN  08/21/21 0356

## 2021-08-21 NOTE — ED PROVIDER NOTES
History  Chief Complaint   Patient presents with    Abdominal Pain     Pt c/o lower abd pain, pelvic and vaginal pain x3 days  Pt also reports vaginal discharge     Patient is a 72-year-old female with no significant past medical history presents with a lower abdominal cramping, pelvic pain with some vaginal pain for 3 days  Patient states it is difficult to describe but she has this lower cramping pain across her abdomen that intermittently radiates into her vagina  She notes her typical baseline vaginal discharge, but denies any abnormal vaginal discharge, vaginal bleeding, vulvovaginal irritation or lesions, dysuria, hematuria, urinary frequency or urgency  She notes intermittent episodes of nausea, but denies any vomiting, diarrhea, previous similar symptoms, history of abdominal surgeries or ovarian cysts  She denies any known aggravating or alleviating factors, and has not tried anything to help alleviate her symptoms  Patient states she is otherwise in her usual state of health and denies any fevers, chills, diaphoresis, headache, dizziness, lightheadedness, neck pain or stiffness, congestion, cough, shortness of breath, chest pain  Patient is currently sexually active, but denies any concern for STDs  Patient's last menstrual period was 7/12  Patient states she has irregular menstrual periods  Prior to Admission Medications   Prescriptions Last Dose Informant Patient Reported? Taking?    albuterol (ProAir HFA) 90 mcg/act inhaler   No No   Sig: Inhale 2 puffs every 6 (six) hours as needed for wheezing   benzonatate (TESSALON PERLES) 100 mg capsule   No No   Sig: Take 1 capsule (100 mg total) by mouth 3 (three) times a day as needed for cough   Patient not taking: Reported on 1/25/2021   butalbital-acetaminophen-caffeine (FIORICET,ESGIC) -40 mg per tablet   No No   Sig: Take 1 tablet by mouth every 6 (six) hours as needed for headaches   Patient not taking: Reported on 1/25/2021 dextromethorphan-guaifenesin (MUCINEX DM)  MG per 12 hr tablet   No No   Sig: Take 1 tablet by mouth every 12 (twelve) hours as needed for cough   Patient not taking: Reported on 2021   fluticasone (FLONASE) 50 mcg/act nasal spray   No No   Si spray into each nostril daily   Patient not taking: Reported on 2021   ibuprofen (MOTRIN) 800 mg tablet   Yes No   Sig: Take 800 mg by mouth every 8 (eight) hours as needed for mild pain   meclizine (ANTIVERT) 25 mg tablet   No No   Sig: Take 1 tablet (25 mg total) by mouth 3 (three) times a day as needed for dizziness   ondansetron (ZOFRAN-ODT) 4 mg disintegrating tablet   No No   Sig: Take 1 tablet (4 mg total) by mouth every 6 (six) hours as needed for nausea or vomiting      Facility-Administered Medications: None       Past Medical History:   Diagnosis Date    Hx of migraine headaches        Past Surgical History:   Procedure Laterality Date    NO PAST SURGERIES         History reviewed  No pertinent family history  I have reviewed and agree with the history as documented  E-Cigarette/Vaping    E-Cigarette Use Never User      E-Cigarette/Vaping Substances    Nicotine No     THC No     CBD No     Flavoring No     Other No     Unknown No      Social History     Tobacco Use    Smoking status: Never Smoker    Smokeless tobacco: Never Used   Vaping Use    Vaping Use: Never used   Substance Use Topics    Alcohol use: Not Currently     Comment: occasional    Drug use: No       Review of Systems   Constitutional: Negative for chills, diaphoresis and fever  HENT: Negative for congestion  Respiratory: Negative for cough, shortness of breath, wheezing and stridor  Cardiovascular: Negative for chest pain and palpitations  Gastrointestinal: Positive for abdominal pain and nausea  Negative for diarrhea and vomiting  Genitourinary: Positive for pelvic pain, vaginal discharge (patient's baseline) and vaginal pain   Negative for difficulty urinating, dysuria, flank pain, frequency, hematuria, urgency and vaginal bleeding  Musculoskeletal: Negative for myalgias, neck pain and neck stiffness  Skin: Negative for color change, pallor and rash  Neurological: Negative for dizziness, weakness, light-headedness, numbness and headaches  All other systems reviewed and are negative  Physical Exam  Physical Exam  Vitals and nursing note reviewed  Constitutional:       General: She is awake  She is not in acute distress  Appearance: She is well-developed  She is not ill-appearing, toxic-appearing or diaphoretic  HENT:      Head: Normocephalic and atraumatic  Right Ear: Hearing and external ear normal       Left Ear: Hearing and external ear normal       Nose: Nose normal    Eyes:      Conjunctiva/sclera: Conjunctivae normal       Pupils: Pupils are equal, round, and reactive to light  Cardiovascular:      Rate and Rhythm: Normal rate and regular rhythm  Heart sounds: Normal heart sounds, S1 normal and S2 normal    Pulmonary:      Effort: Pulmonary effort is normal  No respiratory distress  Breath sounds: Normal breath sounds  No stridor  No decreased breath sounds or wheezing  Abdominal:      General: Bowel sounds are normal  There is no distension  Palpations: Abdomen is soft  Tenderness: There is abdominal tenderness in the right lower quadrant, suprapubic area and left lower quadrant  There is no right CVA tenderness, left CVA tenderness, guarding or rebound  Musculoskeletal:         General: Normal range of motion  Cervical back: Normal range of motion and neck supple  Comments: Moving all extremities freely, ambulating without issue   Skin:     General: Skin is warm and dry  Capillary Refill: Capillary refill takes less than 2 seconds  Neurological:      Mental Status: She is alert and oriented to person, place, and time  GCS: GCS eye subscore is 4  GCS verbal subscore is 5  GCS motor subscore is 6  Psychiatric:         Behavior: Behavior is cooperative           Vital Signs  ED Triage Vitals   Temperature Pulse Respirations Blood Pressure SpO2   08/20/21 2319 08/20/21 2321 08/20/21 2321 08/20/21 2321 08/20/21 2321   98 3 °F (36 8 °C) 95 16 128/74 98 %      Temp Source Heart Rate Source Patient Position - Orthostatic VS BP Location FiO2 (%)   08/20/21 2319 08/20/21 2321 08/20/21 2321 08/20/21 2321 --   Oral Monitor Sitting Right arm       Pain Score       08/20/21 2321       7           Vitals:    08/20/21 2321   BP: 128/74   Pulse: 95   Patient Position - Orthostatic VS: Sitting         Visual Acuity      ED Medications  Medications   acetaminophen (TYLENOL) tablet 650 mg (650 mg Oral Given 8/21/21 0057)       Diagnostic Studies  Results Reviewed     Procedure Component Value Units Date/Time    POCT pregnancy, urine [[id number]]  (Abnormal) Resulted: 08/21/21 0032    Lab Status: Edited Result - FINAL Updated: 08/21/21 0244     EXT PREG TEST UR (Ref: Negative) positive     Control valid    hCG, quantitative [[id number]]  (Abnormal) Collected: 08/21/21 0056    Lab Status: Final result Specimen: Blood from Arm, Right Updated: 08/21/21 0201     HCG, Quant 296 2 mIU/mL     Narrative:       Expected Ranges:     Approximate               Approximate HCG  Gestation age          Concentration ( mIU/mL)  _____________          ______________________   Aun Dally                      HCG values  0 2-1                       5-50  1-2                           2-3                         100-5000  3-4                         500-01664  4-5                         1000-88701  5-6                         80412-310677  6-8                         99837-102794  8-12                        72566-195894      Comprehensive metabolic panel [[id number]]  (Abnormal) Collected: 08/21/21 0056    Lab Status: Final result Specimen: Blood from Arm, Right Updated: 08/21/21 0129     Sodium 137 mmol/L      Potassium 4 1 mmol/L      Chloride 105 mmol/L      CO2 25 mmol/L      ANION GAP 7 mmol/L      BUN 15 mg/dL      Creatinine 0 80 mg/dL      Glucose 84 mg/dL      Calcium 8 2 mg/dL      Corrected Calcium 8 8 mg/dL      AST 12 U/L      ALT 18 U/L      Alkaline Phosphatase 50 U/L      Total Protein 7 1 g/dL      Albumin 3 3 g/dL      Total Bilirubin 0 67 mg/dL      eGFR 111 ml/min/1 73sq m     Narrative:      National Kidney Disease Foundation guidelines for Chronic Kidney Disease (CKD):     Stage 1 with normal or high GFR (GFR > 90 mL/min/1 73 square meters)    Stage 2 Mild CKD (GFR = 60-89 mL/min/1 73 square meters)    Stage 3A Moderate CKD (GFR = 45-59 mL/min/1 73 square meters)    Stage 3B Moderate CKD (GFR = 30-44 mL/min/1 73 square meters)    Stage 4 Severe CKD (GFR = 15-29 mL/min/1 73 square meters)    Stage 5 End Stage CKD (GFR <15 mL/min/1 73 square meters)  Note: GFR calculation is accurate only with a steady state creatinine    Lipase [[id number]]  (Normal) Collected: 08/21/21 0056    Lab Status: Final result Specimen: Blood from Arm, Right Updated: 08/21/21 0129     Lipase 160 u/L     Urine Microscopic [[id number]]  (Abnormal) Collected: 08/21/21 0032    Lab Status: Final result Specimen: Urine, Clean Catch Updated: 08/21/21 0119     RBC, UA 1-2 /hpf      WBC, UA 2-4 /hpf      Epithelial Cells Occasional /hpf      Bacteria, UA Occasional /hpf      AMORPH URATES Moderate /hpf     CBC and differential [[id number]]  (Abnormal) Collected: 08/21/21 0057    Lab Status: Final result Specimen: Blood from Arm, Right Updated: 08/21/21 0104     WBC 6 92 Thousand/uL      RBC 3 80 Million/uL      Hemoglobin 11 0 g/dL      Hematocrit 32 2 %      MCV 85 fL      MCH 28 9 pg      MCHC 34 2 g/dL      RDW 13 1 %      MPV 9 7 fL      Platelets 158 Thousands/uL      nRBC 0 /100 WBCs      Neutrophils Relative 58 %      Immat GRANS % 0 %      Lymphocytes Relative 31 %      Monocytes Relative 9 %      Eosinophils Relative 1 % Basophils Relative 1 %      Neutrophils Absolute 4 01 Thousands/µL      Immature Grans Absolute 0 01 Thousand/uL      Lymphocytes Absolute 2 16 Thousands/µL      Monocytes Absolute 0 61 Thousand/µL      Eosinophils Absolute 0 08 Thousand/µL      Basophils Absolute 0 05 Thousands/µL     Urine Macroscopic, POC [[id number]]  (Abnormal) Collected: 08/21/21 0032    Lab Status: Final result Specimen: Urine Updated: 08/21/21 0033     Color, UA Yellow     Clarity, UA Clear     pH, UA 7 0     Leukocytes, UA Small     Nitrite, UA Negative     Protein, UA Negative mg/dl      Glucose, UA Negative mg/dl      Ketones, UA Negative mg/dl      Urobilinogen, UA 0 2 E U /dl      Bilirubin, UA Negative     Blood, UA Negative     Specific Gravity, UA >=1 030    Narrative:      CLINITEK RESULT                 US OB pregnancy limited with transvaginal   Final Result by Jacqueline Petersen MD (08/21 5573)       No intrauterine gestational sac is seen  The differential diagnosis at this point includes early intrauterine pregnancy, failed pregnancy, or occult ectopic pregnancy  Clinical correlation with serial beta HCG and followup ultrasound are recommended  Workstation performed: [id number]                    Procedures  Procedures         ED Course  ED Course as of Aug 21 0512   Sat Aug 21, 2021   0239 HCG QUANTITATIVE(!): 296 2                                           MDM  Number of Diagnoses or Management Options  Abdominal pain in pregnancy  Diagnosis management comments: Patient was informed of positive pregnancy test, answered questions  This is patient's 1st pregnancy  Patient agreeable to workup for abdominal pain in pregnancy  Patient became frustrated with length of time for workup  Still awaiting ultrasound results  Patient wishes to leave at this time and does not wish to stay for ultrasound results  I discussed with patient that she would be leaving A if she chose to leave at this time    Reviewed risks with patient, answered questions  Patient still wishes to leave AMA and agreeable to sign paperwork  The patient insists on leaving against medical advice, despite my recommendation to remain for ongoing treatment     1: Capacity: I have determined that the patient has capacity to make the decision to leave against medical advice based on the following:    A  Ability to express a choice: The patient is able to express his or her choice and communicate that choice  [place] to understand relevant information: The patient is able to verbalize their diagnosis, understand information about the purpose of treatment, remember the information, and show that he or she can be part of the decision-making process     C  Ability to appreciate the significance of the information and its consequences: The patient understands the consequences of treatment refusal and the risks and benefits of accepting or refusing treatment     D  Ability to manipulate information: The patient is able to engage in reasoning as it applies to making treatment decisions   2: Psychiatric Consultation: There is not an indication to call psychiatry consultation to determine capacity    3  Alternative Treatment: I have discussed the recommended course of treatment and available alternatives  4  Risks: I have discussed the specific risks of that patient refusing treatment including but not limited to ectopic pregnancy, ruptured ectopic pregnancy, shock, organ damage, threatened pregnancy, loss of pregnancy, death    5  Follow-up Care: I have discussed the follow-up care and advised to see patient's OBGYN and PCP immediately or return here for worsening  6  ED Option: I have emphasized that the patient has the option to return to the ED  Reviewed that we can have continuation of the workup at any time and that we are always open      Patient did not wish to wait for lab slip for hcg to be completed in 2 days or discharge paperwork, will place order in system for patient to complete blood work in the future  Patient was instructed to follow-up with her OBGYN as soon as possible for further evaluation and monitoring of pregnancy  Informed patient that we will call if any significant findings are noted on ultrasound  Disposition  Final diagnoses:   Abdominal pain in pregnancy     Time reflects when diagnosis was documented in both MDM as applicable and the Disposition within this note     Time User Action Codes Description Comment    8/21/2021  3:10 AM Elsa Fulton Add [O26 899,  R10 9] Abdominal pain in pregnancy       ED Disposition     ED Disposition Condition Date/Time Comment    [facility] Aug 21, 2021  3:10 AM Date: 8/21/2021  Patient: Enrique Blanca  Admitted: 8/20/2021 11:46 PM  Attending Provider: Dr Ling Squires or her authorized caregiver has made the decision for the patient to leave the emergency department against the advice of the emerge ncy department staff  She or her authorized caregiver has been informed and understands the inherent risks, including death, ectopic pregnancy, ruptured ectopic pregnancy, organ damage, shock, loss of pregnancy  She or her authorized caregiver has d ecided to accept the responsibility for this decision  Enrique Blanca and all necessary parties have been advised that she may return for further evaluation or treatment  Her condition at time of discharge was stable    Enrique Blanca had current vital  signs as follows:  /74 (BP Location: Right arm)   Pulse 95   Temp 98 3 °F (36 8 °C) (Oral)   Resp 16   Wt 73 5 kg (162 lb 0 6 oz)   LMP 07/13/2021         Follow-up Information     Follow up With Specialties Details Why Contact Info Additional [facility] Emergency Department Emergency Medicine  If symptoms worsen [facility] [id number]  [address] [place] [address] Emergency Department, [address] [place] [address], [address] [facility]etric[facility]necology Schedule an appointment as soon as possible for a visit   [address], [address] [address] [address], [address], [address] [place], [place], [address] [phone]          Discharge Medication List as of 8/21/2021  3:20 AM      CONTINUE these medications which have NOT CHANGED    Details   albuterol (ProAir HFA) 90 mcg/act inhaler Inhale 2 puffs every 6 (six) hours as needed for wheezing, Starting Mon 1/25/2021, Normal      benzonatate (TESSALON PERLES) 100 mg capsule Take 1 capsule (100 mg total) by mouth 3 (three) times a day as needed for cough, Starting Fri 11/6/2020, Normal      butalbital-acetaminophen-caffeine (FIORICET,ESGIC) -40 mg per tablet Take 1 tablet by mouth every 6 (six) hours as needed for headaches, Starting Fri 11/6/2020, Normal      dextromethorphan-guaifenesin (MUCINEX DM)  MG per 12 hr tablet Take 1 tablet by mouth every 12 (twelve) hours as needed for cough, Starting Fri 11/6/2020, Normal      fluticasone (FLONASE) 50 mcg/act nasal spray 1 spray into each nostril daily, Starting Fri 11/6/2020, Normal      ibuprofen (MOTRIN) 800 mg tablet Take 800 mg by mouth every 8 (eight) hours as needed for mild pain, Historical Med      meclizine (ANTIVERT) 25 mg tablet Take 1 tablet (25 mg total) by mouth 3 (three) times a day as needed for dizziness, Starting Sun 5/2/2021, Normal      ondansetron (ZOFRAN-ODT) 4 mg disintegrating tablet Take 1 tablet (4 mg total) by mouth every 6 (six) hours as needed for nausea or vomiting, Starting Sun 5/2/2021, Normal           Outpatient Discharge Orders   hCG, quantitative   Standing Status: Future Standing Exp   Date: 08/21/22       PDMP Review     None          ED Provider  Electronically Signed by           Kenrick Teixeira PA-C  08/21/21 6163

## 2021-10-06 ENCOUNTER — OFFICE VISIT (OUTPATIENT)
Dept: OBGYN CLINIC | Facility: CLINIC | Age: 36
End: 2021-10-06
Payer: COMMERCIAL

## 2021-10-06 ENCOUNTER — PATIENT MESSAGE (OUTPATIENT)
Dept: OBGYN CLINIC | Facility: CLINIC | Age: 36
End: 2021-10-06

## 2021-10-06 ENCOUNTER — LAB (OUTPATIENT)
Dept: LAB | Facility: CLINIC | Age: 36
End: 2021-10-06
Payer: COMMERCIAL

## 2021-10-06 VITALS
HEART RATE: 86 BPM | HEIGHT: 63 IN | WEIGHT: 162.8 LBS | SYSTOLIC BLOOD PRESSURE: 124 MMHG | BODY MASS INDEX: 28.84 KG/M2 | DIASTOLIC BLOOD PRESSURE: 70 MMHG

## 2021-10-06 DIAGNOSIS — O02.1 MISSED ABORTION: Primary | ICD-10-CM

## 2021-10-06 DIAGNOSIS — O02.1 MISSED ABORTION: ICD-10-CM

## 2021-10-06 LAB
ABO GROUP BLD: NORMAL
B-HCG SERPL-ACNC: ABNORMAL MIU/ML
BLD GP AB SCN SERPL QL: NEGATIVE
RH BLD: POSITIVE
SPECIMEN EXPIRATION DATE: NORMAL

## 2021-10-06 PROCEDURE — 84702 CHORIONIC GONADOTROPIN TEST: CPT

## 2021-10-06 PROCEDURE — 36415 COLL VENOUS BLD VENIPUNCTURE: CPT

## 2021-10-06 PROCEDURE — 86900 BLOOD TYPING SEROLOGIC ABO: CPT

## 2021-10-06 PROCEDURE — 86901 BLOOD TYPING SEROLOGIC RH(D): CPT

## 2021-10-06 PROCEDURE — 86850 RBC ANTIBODY SCREEN: CPT

## 2021-10-06 PROCEDURE — 99213 OFFICE O/P EST LOW 20 MIN: CPT | Performed by: NURSE PRACTITIONER

## 2021-10-07 ENCOUNTER — TELEPHONE (OUTPATIENT)
Dept: OBGYN CLINIC | Facility: CLINIC | Age: 36
End: 2021-10-07

## 2021-10-09 ENCOUNTER — HOSPITAL ENCOUNTER (OUTPATIENT)
Dept: ULTRASOUND IMAGING | Facility: HOSPITAL | Age: 36
Discharge: HOME | End: 2021-10-09
Attending: NURSE PRACTITIONER
Payer: COMMERCIAL

## 2021-10-09 DIAGNOSIS — O02.1 MISSED ABORTION: ICD-10-CM

## 2021-10-09 PROCEDURE — 76817 TRANSVAGINAL US OBSTETRIC: CPT

## 2021-10-09 PROCEDURE — 76816 OB US FOLLOW-UP PER FETUS: CPT

## 2021-10-11 ENCOUNTER — ANESTHESIA (EMERGENCY)
Dept: PERIOP | Facility: HOSPITAL | Age: 36
End: 2021-10-11
Payer: COMMERCIAL

## 2021-10-11 ENCOUNTER — TELEPHONE (OUTPATIENT)
Dept: OBGYN CLINIC | Facility: CLINIC | Age: 36
End: 2021-10-11

## 2021-10-11 ENCOUNTER — HOSPITAL ENCOUNTER (OUTPATIENT)
Facility: HOSPITAL | Age: 36
Setting detail: OUTPATIENT SURGERY
Discharge: HOME | End: 2021-10-12
Attending: EMERGENCY MEDICINE | Admitting: OBSTETRICS & GYNECOLOGY
Payer: COMMERCIAL

## 2021-10-11 ENCOUNTER — ANESTHESIA EVENT (EMERGENCY)
Dept: PERIOP | Facility: HOSPITAL | Age: 36
End: 2021-10-11
Payer: COMMERCIAL

## 2021-10-11 DIAGNOSIS — O02.1 MISSED ABORTION: Primary | ICD-10-CM

## 2021-10-11 DIAGNOSIS — R11.2 NAUSEA AND VOMITING: ICD-10-CM

## 2021-10-11 DIAGNOSIS — Z98.890 S/P D&C (STATUS POST DILATION AND CURETTAGE): ICD-10-CM

## 2021-10-11 LAB
ABO GROUP BLD: NORMAL
ALBUMIN SERPL BCP-MCNC: 3.4 G/DL (ref 3.5–5)
ALP SERPL-CCNC: 44 U/L (ref 46–116)
ALT SERPL W P-5'-P-CCNC: 16 U/L (ref 12–78)
ANION GAP SERPL CALCULATED.3IONS-SCNC: 10 MMOL/L (ref 4–13)
AST SERPL W P-5'-P-CCNC: 16 U/L (ref 5–45)
B-HCG SERPL-ACNC: ABNORMAL MIU/ML
BASOPHILS # BLD AUTO: 0.04 THOUSANDS/ΜL (ref 0–0.1)
BASOPHILS NFR BLD AUTO: 1 % (ref 0–1)
BILIRUB SERPL-MCNC: 0.43 MG/DL (ref 0.2–1)
BLD GP AB SCN SERPL QL: NEGATIVE
BUN SERPL-MCNC: 8 MG/DL (ref 5–25)
CALCIUM ALBUM COR SERPL-MCNC: 9 MG/DL (ref 8.3–10.1)
CALCIUM SERPL-MCNC: 8.5 MG/DL (ref 8.3–10.1)
CHLORIDE SERPL-SCNC: 102 MMOL/L (ref 100–108)
CO2 SERPL-SCNC: 24 MMOL/L (ref 21–32)
CREAT SERPL-MCNC: 0.63 MG/DL (ref 0.6–1.3)
EOSINOPHIL # BLD AUTO: 0.11 THOUSAND/ΜL (ref 0–0.61)
EOSINOPHIL NFR BLD AUTO: 2 % (ref 0–6)
ERYTHROCYTE [DISTWIDTH] IN BLOOD BY AUTOMATED COUNT: 13.5 % (ref 11.6–15.1)
GFR SERPL CREATININE-BSD FRML MDRD: 135 ML/MIN/1.73SQ M
GLUCOSE SERPL-MCNC: 78 MG/DL (ref 65–140)
HCT VFR BLD AUTO: 34.3 % (ref 34.8–46.1)
HGB BLD-MCNC: 11.9 G/DL (ref 11.5–15.4)
IMM GRANULOCYTES # BLD AUTO: 0.01 THOUSAND/UL (ref 0–0.2)
IMM GRANULOCYTES NFR BLD AUTO: 0 % (ref 0–2)
LIPASE SERPL-CCNC: 84 U/L (ref 73–393)
LYMPHOCYTES # BLD AUTO: 2.39 THOUSANDS/ΜL (ref 0.6–4.47)
LYMPHOCYTES NFR BLD AUTO: 33 % (ref 14–44)
MCH RBC QN AUTO: 29.6 PG (ref 26.8–34.3)
MCHC RBC AUTO-ENTMCNC: 34.7 G/DL (ref 31.4–37.4)
MCV RBC AUTO: 85 FL (ref 82–98)
MONOCYTES # BLD AUTO: 0.47 THOUSAND/ΜL (ref 0.17–1.22)
MONOCYTES NFR BLD AUTO: 7 % (ref 4–12)
NEUTROPHILS # BLD AUTO: 4.16 THOUSANDS/ΜL (ref 1.85–7.62)
NEUTS SEG NFR BLD AUTO: 57 % (ref 43–75)
NRBC BLD AUTO-RTO: 0 /100 WBCS
PLATELET # BLD AUTO: 252 THOUSANDS/UL (ref 149–390)
PMV BLD AUTO: 9.9 FL (ref 8.9–12.7)
POTASSIUM SERPL-SCNC: 3.5 MMOL/L (ref 3.5–5.3)
PROT SERPL-MCNC: 7.3 G/DL (ref 6.4–8.2)
RBC # BLD AUTO: 4.02 MILLION/UL (ref 3.81–5.12)
RH BLD: POSITIVE
SODIUM SERPL-SCNC: 136 MMOL/L (ref 136–145)
SPECIMEN EXPIRATION DATE: NORMAL
TSH SERPL DL<=0.05 MIU/L-ACNC: 0.72 UIU/ML (ref 0.36–3.74)
WBC # BLD AUTO: 7.18 THOUSAND/UL (ref 4.31–10.16)

## 2021-10-11 PROCEDURE — 86901 BLOOD TYPING SEROLOGIC RH(D): CPT | Performed by: OBSTETRICS & GYNECOLOGY

## 2021-10-11 PROCEDURE — 84702 CHORIONIC GONADOTROPIN TEST: CPT | Performed by: PHYSICIAN ASSISTANT

## 2021-10-11 PROCEDURE — 80053 COMPREHEN METABOLIC PANEL: CPT | Performed by: PHYSICIAN ASSISTANT

## 2021-10-11 PROCEDURE — 96374 THER/PROPH/DIAG INJ IV PUSH: CPT

## 2021-10-11 PROCEDURE — 86850 RBC ANTIBODY SCREEN: CPT | Performed by: OBSTETRICS & GYNECOLOGY

## 2021-10-11 PROCEDURE — 83690 ASSAY OF LIPASE: CPT | Performed by: PHYSICIAN ASSISTANT

## 2021-10-11 PROCEDURE — 99284 EMERGENCY DEPT VISIT MOD MDM: CPT

## 2021-10-11 PROCEDURE — 99284 EMERGENCY DEPT VISIT MOD MDM: CPT | Performed by: PHYSICIAN ASSISTANT

## 2021-10-11 PROCEDURE — 86900 BLOOD TYPING SEROLOGIC ABO: CPT | Performed by: OBSTETRICS & GYNECOLOGY

## 2021-10-11 PROCEDURE — 99285 EMERGENCY DEPT VISIT HI MDM: CPT | Performed by: OBSTETRICS & GYNECOLOGY

## 2021-10-11 PROCEDURE — 96361 HYDRATE IV INFUSION ADD-ON: CPT

## 2021-10-11 PROCEDURE — 84443 ASSAY THYROID STIM HORMONE: CPT | Performed by: OBSTETRICS & GYNECOLOGY

## 2021-10-11 PROCEDURE — 85025 COMPLETE CBC W/AUTO DIFF WBC: CPT | Performed by: PHYSICIAN ASSISTANT

## 2021-10-11 PROCEDURE — 86920 COMPATIBILITY TEST SPIN: CPT

## 2021-10-11 PROCEDURE — 36415 COLL VENOUS BLD VENIPUNCTURE: CPT | Performed by: PHYSICIAN ASSISTANT

## 2021-10-11 RX ORDER — ONDANSETRON 2 MG/ML
4 INJECTION INTRAMUSCULAR; INTRAVENOUS ONCE
Status: COMPLETED | OUTPATIENT
Start: 2021-10-11 | End: 2021-10-11

## 2021-10-11 RX ADMIN — ONDANSETRON 4 MG: 2 INJECTION INTRAMUSCULAR; INTRAVENOUS at 17:31

## 2021-10-11 RX ADMIN — SODIUM CHLORIDE 1000 ML: 0.9 INJECTION, SOLUTION INTRAVENOUS at 17:30

## 2021-10-12 VITALS
WEIGHT: 164.24 LBS | DIASTOLIC BLOOD PRESSURE: 57 MMHG | BODY MASS INDEX: 29.1 KG/M2 | HEART RATE: 82 BPM | HEIGHT: 63 IN | SYSTOLIC BLOOD PRESSURE: 104 MMHG | RESPIRATION RATE: 18 BRPM | TEMPERATURE: 97.8 F | OXYGEN SATURATION: 97 %

## 2021-10-12 PROBLEM — Z98.890 S/P D&C (STATUS POST DILATION AND CURETTAGE): Status: ACTIVE | Noted: 2021-10-12

## 2021-10-12 PROCEDURE — 99024 POSTOP FOLLOW-UP VISIT: CPT | Performed by: OBSTETRICS & GYNECOLOGY

## 2021-10-12 PROCEDURE — 88305 TISSUE EXAM BY PATHOLOGIST: CPT | Performed by: PATHOLOGY

## 2021-10-12 PROCEDURE — 59820 CARE OF MISCARRIAGE: CPT | Performed by: OBSTETRICS & GYNECOLOGY

## 2021-10-12 PROCEDURE — 96375 TX/PRO/DX INJ NEW DRUG ADDON: CPT

## 2021-10-12 RX ORDER — IBUPROFEN 600 MG/1
600 TABLET ORAL EVERY 6 HOURS PRN
Status: DISCONTINUED | OUTPATIENT
Start: 2021-10-12 | End: 2021-10-12 | Stop reason: HOSPADM

## 2021-10-12 RX ORDER — DOXYCYCLINE HYCLATE 100 MG/1
200 CAPSULE ORAL ONCE
Status: COMPLETED | OUTPATIENT
Start: 2021-10-12 | End: 2021-10-12

## 2021-10-12 RX ORDER — LIDOCAINE HYDROCHLORIDE 20 MG/ML
INJECTION, SOLUTION EPIDURAL; INFILTRATION; INTRACAUDAL; PERINEURAL AS NEEDED
Status: DISCONTINUED | OUTPATIENT
Start: 2021-10-12 | End: 2021-10-12

## 2021-10-12 RX ORDER — METHYLERGONOVINE MALEATE 0.2 MG/1
0.2 TABLET ORAL EVERY 6 HOURS
Qty: 4 TABLET | Refills: 0 | Status: SHIPPED | OUTPATIENT
Start: 2021-10-12 | End: 2021-10-22 | Stop reason: ALTCHOICE

## 2021-10-12 RX ORDER — METHYLERGONOVINE MALEATE 0.2 MG/ML
INJECTION INTRAVENOUS AS NEEDED
Status: DISCONTINUED | OUTPATIENT
Start: 2021-10-12 | End: 2021-10-12

## 2021-10-12 RX ORDER — FENTANYL CITRATE 50 UG/ML
INJECTION, SOLUTION INTRAMUSCULAR; INTRAVENOUS AS NEEDED
Status: DISCONTINUED | OUTPATIENT
Start: 2021-10-12 | End: 2021-10-12

## 2021-10-12 RX ORDER — SODIUM CHLORIDE 9 MG/ML
INJECTION, SOLUTION INTRAVENOUS CONTINUOUS PRN
Status: DISCONTINUED | OUTPATIENT
Start: 2021-10-12 | End: 2021-10-12

## 2021-10-12 RX ORDER — ONDANSETRON 2 MG/ML
INJECTION INTRAMUSCULAR; INTRAVENOUS AS NEEDED
Status: DISCONTINUED | OUTPATIENT
Start: 2021-10-12 | End: 2021-10-12

## 2021-10-12 RX ORDER — DEXAMETHASONE SODIUM PHOSPHATE 4 MG/ML
INJECTION, SOLUTION INTRA-ARTICULAR; INTRALESIONAL; INTRAMUSCULAR; INTRAVENOUS; SOFT TISSUE AS NEEDED
Status: DISCONTINUED | OUTPATIENT
Start: 2021-10-12 | End: 2021-10-12

## 2021-10-12 RX ORDER — MIDAZOLAM HYDROCHLORIDE 2 MG/2ML
INJECTION, SOLUTION INTRAMUSCULAR; INTRAVENOUS AS NEEDED
Status: DISCONTINUED | OUTPATIENT
Start: 2021-10-12 | End: 2021-10-12

## 2021-10-12 RX ORDER — ACETAMINOPHEN 325 MG/1
650 TABLET ORAL EVERY 6 HOURS PRN
Status: DISCONTINUED | OUTPATIENT
Start: 2021-10-12 | End: 2021-10-12 | Stop reason: HOSPADM

## 2021-10-12 RX ORDER — PROPOFOL 10 MG/ML
INJECTION, EMULSION INTRAVENOUS AS NEEDED
Status: DISCONTINUED | OUTPATIENT
Start: 2021-10-12 | End: 2021-10-12

## 2021-10-12 RX ORDER — ONDANSETRON 2 MG/ML
4 INJECTION INTRAMUSCULAR; INTRAVENOUS ONCE AS NEEDED
Status: DISCONTINUED | OUTPATIENT
Start: 2021-10-12 | End: 2021-10-12 | Stop reason: HOSPADM

## 2021-10-12 RX ORDER — METOCLOPRAMIDE HYDROCHLORIDE 5 MG/ML
INJECTION INTRAMUSCULAR; INTRAVENOUS AS NEEDED
Status: DISCONTINUED | OUTPATIENT
Start: 2021-10-12 | End: 2021-10-12

## 2021-10-12 RX ORDER — FENTANYL CITRATE/PF 50 MCG/ML
25 SYRINGE (ML) INJECTION
Status: DISCONTINUED | OUTPATIENT
Start: 2021-10-12 | End: 2021-10-12 | Stop reason: HOSPADM

## 2021-10-12 RX ADMIN — DEXAMETHASONE SODIUM PHOSPHATE 8 MG: 4 INJECTION INTRA-ARTICULAR; INTRALESIONAL; INTRAMUSCULAR; INTRAVENOUS; SOFT TISSUE at 00:15

## 2021-10-12 RX ADMIN — FENTANYL CITRATE 25 MCG: 50 INJECTION INTRAMUSCULAR; INTRAVENOUS at 01:16

## 2021-10-12 RX ADMIN — DOXYCYCLINE 200 MG: 100 CAPSULE ORAL at 02:05

## 2021-10-12 RX ADMIN — PROPOFOL 200 MG: 10 INJECTION, EMULSION INTRAVENOUS at 00:11

## 2021-10-12 RX ADMIN — FENTANYL CITRATE 50 MCG: 50 INJECTION INTRAMUSCULAR; INTRAVENOUS at 00:11

## 2021-10-12 RX ADMIN — LIDOCAINE HYDROCHLORIDE 100 MG: 20 INJECTION, SOLUTION EPIDURAL; INFILTRATION; INTRACAUDAL; PERINEURAL at 00:11

## 2021-10-12 RX ADMIN — FENTANYL CITRATE 50 MCG: 50 INJECTION INTRAMUSCULAR; INTRAVENOUS at 00:17

## 2021-10-12 RX ADMIN — ONDANSETRON 4 MG: 2 INJECTION INTRAMUSCULAR; INTRAVENOUS at 00:15

## 2021-10-12 RX ADMIN — METOCLOPRAMIDE 10 MG: 5 INJECTION, SOLUTION INTRAMUSCULAR; INTRAVENOUS at 00:08

## 2021-10-12 RX ADMIN — MIDAZOLAM 2 MG: 1 INJECTION INTRAMUSCULAR; INTRAVENOUS at 00:00

## 2021-10-12 RX ADMIN — METHYLERGONOVINE MALEATE 0.2 MG: 0.2 INJECTION, SOLUTION INTRAMUSCULAR; INTRAVENOUS at 00:38

## 2021-10-12 RX ADMIN — SODIUM CHLORIDE: 0.9 INJECTION, SOLUTION INTRAVENOUS at 00:00

## 2021-10-12 RX ADMIN — PROPOFOL 100 MG: 10 INJECTION, EMULSION INTRAVENOUS at 00:32

## 2021-10-13 ENCOUNTER — TELEPHONE (OUTPATIENT)
Dept: OBGYN CLINIC | Facility: CLINIC | Age: 36
End: 2021-10-13

## 2021-10-14 ENCOUNTER — APPOINTMENT (OUTPATIENT)
Dept: LAB | Age: 36
End: 2021-10-14
Payer: COMMERCIAL

## 2021-10-14 DIAGNOSIS — O02.1 MISSED ABORTION: ICD-10-CM

## 2021-10-14 LAB — B-HCG SERPL-ACNC: ABNORMAL MIU/ML

## 2021-10-14 PROCEDURE — 84702 CHORIONIC GONADOTROPIN TEST: CPT

## 2021-10-14 PROCEDURE — 36415 COLL VENOUS BLD VENIPUNCTURE: CPT

## 2021-10-15 LAB
ABO GROUP BLD BPU: NORMAL
ABO GROUP BLD BPU: NORMAL
BPU ID: NORMAL
BPU ID: NORMAL
CROSSMATCH: NORMAL
CROSSMATCH: NORMAL
UNIT DISPENSE STATUS: NORMAL
UNIT DISPENSE STATUS: NORMAL
UNIT PRODUCT CODE: NORMAL
UNIT PRODUCT CODE: NORMAL
UNIT PRODUCT VOLUME: 300 ML
UNIT PRODUCT VOLUME: 350 ML
UNIT RH: NORMAL
UNIT RH: NORMAL

## 2021-10-22 ENCOUNTER — APPOINTMENT (OUTPATIENT)
Dept: LAB | Facility: CLINIC | Age: 36
End: 2021-10-22
Payer: COMMERCIAL

## 2021-10-22 ENCOUNTER — OFFICE VISIT (OUTPATIENT)
Dept: OBGYN CLINIC | Facility: CLINIC | Age: 36
End: 2021-10-22
Payer: COMMERCIAL

## 2021-10-22 VITALS
WEIGHT: 163.6 LBS | HEIGHT: 63 IN | HEART RATE: 77 BPM | SYSTOLIC BLOOD PRESSURE: 102 MMHG | BODY MASS INDEX: 28.99 KG/M2 | DIASTOLIC BLOOD PRESSURE: 70 MMHG

## 2021-10-22 DIAGNOSIS — O02.1 MISSED ABORTION: ICD-10-CM

## 2021-10-22 DIAGNOSIS — N63.0 BREAST LUMP: ICD-10-CM

## 2021-10-22 DIAGNOSIS — Z98.890 S/P D&C (STATUS POST DILATION AND CURETTAGE): ICD-10-CM

## 2021-10-22 DIAGNOSIS — Z98.890 S/P D&C (STATUS POST DILATION AND CURETTAGE): Primary | ICD-10-CM

## 2021-10-22 DIAGNOSIS — N64.4 BREAST PAIN: ICD-10-CM

## 2021-10-22 LAB — B-HCG SERPL-ACNC: 839 MIU/ML

## 2021-10-22 PROCEDURE — 99024 POSTOP FOLLOW-UP VISIT: CPT | Performed by: OBSTETRICS & GYNECOLOGY

## 2021-10-22 PROCEDURE — 84702 CHORIONIC GONADOTROPIN TEST: CPT

## 2021-10-22 PROCEDURE — 36415 COLL VENOUS BLD VENIPUNCTURE: CPT

## 2021-12-03 ENCOUNTER — APPOINTMENT (OUTPATIENT)
Dept: URGENT CARE | Age: 36
End: 2021-12-03
Payer: OTHER MISCELLANEOUS

## 2021-12-03 PROCEDURE — G0382 LEV 3 HOSP TYPE B ED VISIT: HCPCS | Performed by: PHYSICIAN ASSISTANT

## 2021-12-03 PROCEDURE — 99283 EMERGENCY DEPT VISIT LOW MDM: CPT | Performed by: PHYSICIAN ASSISTANT

## 2021-12-06 ENCOUNTER — APPOINTMENT (OUTPATIENT)
Dept: URGENT CARE | Age: 36
End: 2021-12-06
Payer: OTHER MISCELLANEOUS

## 2021-12-06 PROCEDURE — 99213 OFFICE O/P EST LOW 20 MIN: CPT

## 2021-12-09 DIAGNOSIS — O02.1 MISSED ABORTION: Primary | ICD-10-CM

## 2022-07-14 ENCOUNTER — TELEPHONE (OUTPATIENT)
Dept: GASTROENTEROLOGY | Facility: CLINIC | Age: 37
End: 2022-07-14

## 2022-07-14 NOTE — TELEPHONE ENCOUNTER
Called patient regarding his missed 7/14 appointment with Dr Jorge Alberto Reis  Left message to call so that we can assist him rescheduling his appointment  Also sent letter

## 2022-10-16 ENCOUNTER — OFFICE VISIT (OUTPATIENT)
Dept: URGENT CARE | Age: 37
End: 2022-10-16
Payer: COMMERCIAL

## 2022-10-16 VITALS
SYSTOLIC BLOOD PRESSURE: 108 MMHG | TEMPERATURE: 99.6 F | OXYGEN SATURATION: 98 % | RESPIRATION RATE: 18 BRPM | DIASTOLIC BLOOD PRESSURE: 70 MMHG | HEART RATE: 108 BPM

## 2022-10-16 DIAGNOSIS — R11.2 NAUSEA AND VOMITING, UNSPECIFIED VOMITING TYPE: Primary | ICD-10-CM

## 2022-10-16 PROCEDURE — 0241U HB NFCT DS VIR RESP RNA 4 TRGT: CPT

## 2022-10-16 PROCEDURE — S9083 URGENT CARE CENTER GLOBAL: HCPCS

## 2022-10-16 PROCEDURE — 99213 OFFICE O/P EST LOW 20 MIN: CPT

## 2022-10-16 RX ORDER — ONDANSETRON 4 MG/1
4 TABLET, FILM COATED ORAL EVERY 8 HOURS PRN
Qty: 20 TABLET | Refills: 0 | Status: SHIPPED | OUTPATIENT
Start: 2022-10-16

## 2022-10-16 NOTE — PROGRESS NOTES
[facility]    NAME: Payam Galicia is a 30 y o  female  : 1985    MRN: [id number]  DATE: 2022  TIME: 2:15 PM    Assessment and Plan   Nausea and vomiting, unspecified vomiting type [R11 2]  1  Nausea and vomiting, unspecified vomiting type  ondansetron (ZOFRAN) 4 mg tablet    COVID/FLU/RSV       Patient Instructions   There are no Patient Instructions on file for this visit  Follow up with PCP in 3-5 days  Proceed to ER if symptoms worsen  Chief Complaint     Chief Complaint   Patient presents with   • Cold Like Symptoms     Patient relates having anxiety attack at work, chills, nauseated, headache  Started this AM     History of Present Illness   Nausea  This is a new problem  The current episode started today  Associated symptoms include abdominal pain, chills, myalgias and nausea  Pertinent negatives include no chest pain, coughing, fever, rash, sore throat, urinary symptoms, vomiting or weakness  She has tried nothing for the symptoms  Review of Systems   Review of Systems   Constitutional: Positive for chills  Negative for fever  HENT: Negative for sore throat  Respiratory: Negative for cough  Cardiovascular: Negative for chest pain  Gastrointestinal: Positive for abdominal pain and nausea  Negative for vomiting  Musculoskeletal: Positive for myalgias  Skin: Negative for rash  Neurological: Negative for weakness  All other systems reviewed and are negative  The following portions of the patient's history were reviewed and updated as appropriate: allergies, current medications, past family history, past medical history, past social history, past surgical history and problem list      Medications have been verified  Objective   /70   Pulse (!) 108   Temp 99 6 °F (37 6 °C)   Resp 18   SpO2 98%     Physical Exam  Vitals reviewed  Constitutional:       General: She is not in acute distress  Appearance: She is well-developed   She is not diaphoretic  HENT:      Head: Normocephalic and atraumatic  Nose: Nose normal    Eyes:      Conjunctiva/sclera: Conjunctivae normal       Pupils: Pupils are equal, round, and reactive to light  Cardiovascular:      Rate and Rhythm: Normal rate and regular rhythm  Heart sounds: Normal heart sounds  No murmur heard  No friction rub  No gallop  Pulmonary:      Effort: Pulmonary effort is normal  No respiratory distress  Breath sounds: Normal breath sounds  No wheezing or rales  Abdominal:      General: Bowel sounds are normal  There is no distension  Palpations: Abdomen is soft  Tenderness: There is no abdominal tenderness  Musculoskeletal:         General: Normal range of motion  Cervical back: Normal range of motion and neck supple  Skin:     General: Skin is warm and dry  Findings: No erythema or rash  Neurological:      Mental Status: She is alert and oriented to person, place, and time         Asya Broccoli

## 2022-10-16 NOTE — LETTER
October 16, 2022     Patient: Erlin Spain   YOB: 1985   Date of Visit: 10/16/2022       To Whom it May Concern:    Erlin Spain was seen in my clinic on 10/16/2022  She may return to work on 10/18/22  If you have any questions or concerns, please don't hesitate to call           Sincerely,          DAVID SYLVESTER        CC: No Recipients

## 2022-10-18 LAB
FLUAV RNA RESP QL NAA+PROBE: NEGATIVE
FLUBV RNA RESP QL NAA+PROBE: NEGATIVE
RSV RNA RESP QL NAA+PROBE: POSITIVE
SARS-COV-2 RNA RESP QL NAA+PROBE: NEGATIVE

## 2022-12-19 ENCOUNTER — HOSPITAL ENCOUNTER (EMERGENCY)
Facility: HOSPITAL | Age: 37
Discharge: HOME | End: 2022-12-19
Attending: EMERGENCY MEDICINE

## 2022-12-19 VITALS
DIASTOLIC BLOOD PRESSURE: 73 MMHG | OXYGEN SATURATION: 98 % | SYSTOLIC BLOOD PRESSURE: 125 MMHG | BODY MASS INDEX: 31.29 KG/M2 | HEART RATE: 100 BPM | HEIGHT: 63 IN | TEMPERATURE: 98.4 F | RESPIRATION RATE: 16 BRPM | WEIGHT: 176.59 LBS

## 2022-12-19 DIAGNOSIS — B34.9 VIRAL SYNDROME: Primary | ICD-10-CM

## 2022-12-19 NOTE — ED PROVIDER NOTES
HPI: Patient is a 30 y o  female who presents with 1 days of chills, cough, headache, sore throat and myalgias which the patient describes at mild The patient has had contact with people with similar symptoms  The patient taken OTC medication with relief of symptoms  No Known Allergies    Past Medical History:   Diagnosis Date   • Abnormal Pap smear of cervix 2015    Colpo; f/u paps normal   • Hx of migraine headaches    • Miscarriage       Past Surgical History:   Procedure Laterality Date   • DILATION AND CURETTAGE OF UTERUS N/A 10/11/2021    Procedure: SUCTION DILATATION AND CURETTAGE (D&C); Surgeon: Thi Richmond MD;  Location: [facility] OR;  Service: Gynecology   • NO PAST SURGERIES       Social History     Tobacco Use   • Smoking status: Never   • Smokeless tobacco: Never   Vaping Use   • Vaping Use: Never used   Substance Use Topics   • Alcohol use: Not Currently     Comment: occasional   • Drug use: No       Nursing notes reviewed  Physical Exam:  ED Triage Vitals   Temperature Pulse Respirations Blood Pressure SpO2   12/19/22 1627 12/19/22 1625 12/19/22 1625 12/19/22 1625 12/19/22 1625   98 4 °F (36 9 °C) 100 16 125/73 98 %      Temp Source Heart Rate Source Patient Position - Orthostatic VS BP Location FiO2 (%)   12/19/22 1625 12/19/22 1625 12/19/22 1625 12/19/22 1625 --   Oral Monitor Sitting Right arm       Pain Score       --                  ROS: Positive for chills, body aches, sore throat, cough, congestion, the remainder of a 10 organ system ROS was otherwise unremarkable    General: awake, alert, no acute distress    Head: normocephalic, atraumatic    Eyes: no scleral icterus  Ears: external ears normal, hearing grossly intact  Nose: external exam grossly normal, positive nasal discharge  Neck: symmetric, No JVD noted, trachea midline  Pulmonary: no respiratory distress, no tachypnea noted  Cardiovascular: appears well perfused  Abdomen: no distention noted  Musculoskeletal: no deformities noted, tone normal  Neuro: grossly non-focal  Psych: mood and affect appropriate    The patient is stable and has a history and physical exam consistent with a viral illness  COVID19 testing has been performed  I considered the patient's other medical conditions as applicable/noted above in my medical decision making  The patient is stable upon discharge  The plan is for supportive care at home  The patient (and any family present) verbalized understanding of the discharge instructions and warnings that would necessitate return to the Emergency Department  All questions were answered prior to discharge  Medications - No data to display  Final diagnoses:   Viral syndrome     Time reflects when diagnosis was documented in both MDM as applicable and the Disposition within this note     Time User Action Codes Description Comment    12/19/2022  4:53 PM Sis Francis [B34 9] Viral syndrome       ED Disposition     ED Disposition   Discharge    Condition   Stable    Date/Time   Mon Dec 19, 2022  4:53 PM    Comment   Pao Aguilar discharge to home/self care  Follow-up Information     Follow up With Specialties Details Why Contact Info Additional Information    [id number]  Emergency Department Emergency Medicine   [facility] [id number]  [address] [facility] Emergency Department, [address] [place] [address] [address] [place], [place], [address]        Patient's Medications   Discharge Prescriptions    No medications on file     No discharge procedures on file      Electronically Signed by       Mayte Olvera PA-C  12/19/22 0081

## 2022-12-19 NOTE — Clinical Note
Dorcas Robert was seen and treated in our emergency department on 12/19/2022  Diagnosis:     Wensday  may return to work on return date  She may return on this date: 12/26/2022         If you have any questions or concerns, please don't hesitate to call        April Walsh PA-C    ______________________________           _______________          _______________  Hospital Representative                              Date                                Time

## 2022-12-20 LAB
FLUAV RNA RESP QL NAA+PROBE: NEGATIVE
FLUBV RNA RESP QL NAA+PROBE: NEGATIVE
SARS-COV-2 RNA RESP QL NAA+PROBE: POSITIVE

## 2022-12-20 NOTE — RESULT ENCOUNTER NOTE
Pt/proxy viewed results and the message sent to [employer] account  Vaccinated per triage  O2 98%   No sig PMH aside from elevated BMI

## 2023-12-10 ENCOUNTER — OFFICE VISIT (OUTPATIENT)
Dept: URGENT CARE | Age: 38
End: 2023-12-10
Payer: COMMERCIAL

## 2023-12-10 VITALS
HEART RATE: 77 BPM | OXYGEN SATURATION: 99 % | WEIGHT: 160.2 LBS | BODY MASS INDEX: 28.39 KG/M2 | RESPIRATION RATE: 18 BRPM | DIASTOLIC BLOOD PRESSURE: 74 MMHG | SYSTOLIC BLOOD PRESSURE: 124 MMHG | TEMPERATURE: 97.5 F | HEIGHT: 63 IN

## 2023-12-10 DIAGNOSIS — R10.30 LOWER ABDOMINAL PAIN: ICD-10-CM

## 2023-12-10 DIAGNOSIS — N30.01 ACUTE CYSTITIS WITH HEMATURIA: Primary | ICD-10-CM

## 2023-12-10 LAB
SL AMB  POCT GLUCOSE, UA: NEGATIVE
SL AMB LEUKOCYTE ESTERASE,UA: ABNORMAL
SL AMB POCT BILIRUBIN,UA: NEGATIVE
SL AMB POCT BLOOD,UA: ABNORMAL
SL AMB POCT CLARITY,UA: CLEAR
SL AMB POCT COLOR,UA: ABNORMAL
SL AMB POCT KETONES,UA: 15
SL AMB POCT NITRITE,UA: NEGATIVE
SL AMB POCT PH,UA: 6
SL AMB POCT SPECIFIC GRAVITY,UA: 1.02
SL AMB POCT URINE HCG: NEGATIVE
SL AMB POCT URINE PROTEIN: NEGATIVE
SL AMB POCT UROBILINOGEN: 0.2

## 2023-12-10 PROCEDURE — 87086 URINE CULTURE/COLONY COUNT: CPT

## 2023-12-10 PROCEDURE — 81025 URINE PREGNANCY TEST: CPT

## 2023-12-10 PROCEDURE — S9083 URGENT CARE CENTER GLOBAL: HCPCS

## 2023-12-10 PROCEDURE — 99213 OFFICE O/P EST LOW 20 MIN: CPT

## 2023-12-10 PROCEDURE — 81002 URINALYSIS NONAUTO W/O SCOPE: CPT

## 2023-12-10 RX ORDER — NITROFURANTOIN 25; 75 MG/1; MG/1
100 CAPSULE ORAL 2 TIMES DAILY
Qty: 6 CAPSULE | Refills: 0 | Status: SHIPPED | OUTPATIENT
Start: 2023-12-10 | End: 2023-12-13

## 2023-12-10 NOTE — PROGRESS NOTES
[facility]        NAME: Kam Christianson is a 41 y.o. female  : 1985    MRN: [id number]  DATE: December 10, 2023  TIME: 12:51 PM    Assessment and Plan   Acute cystitis with hematuria [N30.01]  1. Acute cystitis with hematuria  nitrofurantoin (MACROBID) 100 mg capsule      2. Lower abdominal pain  POCT urine dip    Urine culture    POCT urine HCG        Urine dip: Moderate leukocytes, moderate blood, ketones noted. Urine HCG: Negative    Questionable results for original urine testing. Patient contacted to return for repeat testing. Patient had no problems returning to clinic for repeat urine testing. Two consecutive urinary tests were negative for HCG. Patient Instructions     Take antibiotic as prescribed  Drink plenty of water   Cranberry supplements  Urinate within 5 minutes following intercourse  Follow up with OB/GYN  Follow up with PCP in 3-5 days. Proceed to ER if symptoms worsen. Eat yogurt with live and active cultures and/or take a probiotic at least 3 hours before or after antibiotic dose. Monitor stool for diarrhea and/or blood. If this occurs, contact primary care doctor ASAP. Chief Complaint     Chief Complaint   Patient presents with    Abdominal Pain     Abdominal pain since yesterday lower abdomen across left to right.with pain radiating down her legs to her feet. .  Patient states she works at a job that does a lot of pushing and pulling and was recently lifting hugo trees and boxes. Took ibuprofent with no relief. History of Present Illness       Patient is a 40 yo female with no significant PMH presenting in the clinic today for abdominal pain x 1 day. Patient states her job requires repetitive pushing and pulling. She mentions that 2 days ago she was lifting heavy hugo trees and boxes. Admits lower abdominal pain which she describes as cramping. Patient notes my legs and my feet "feel weak" when experiencing the abdominal cramping.   She notes her abdominal pain is exacerbated with nothing in particular. Admits mild lower back pain. Denies fever, chills, chest pain, SOB, numbness, tingling, urinary / bowel incontinence, vaginal bleeding, dysuria, hematuria, n/v/d. Admits the use of ibuprofen for sx management. Last BM yesterday. Last meal yesterday around 5:30pm. LMP 11/19/23. Review of Systems   Review of Systems   Constitutional:  Negative for chills and fever. Respiratory:  Negative for shortness of breath. Cardiovascular:  Negative for chest pain. Gastrointestinal:  Positive for abdominal pain. Negative for diarrhea, nausea and vomiting. Genitourinary:  Negative for dysuria, flank pain, frequency, hematuria, urgency and vaginal bleeding. Musculoskeletal:  Positive for back pain. Skin:  Negative for rash and wound. Neurological:  Positive for weakness. Negative for numbness. Current Medications       Current Outpatient Medications:     nitrofurantoin (MACROBID) 100 mg capsule, Take 1 capsule (100 mg total) by mouth 2 (two) times a day for 3 days, Disp: 6 capsule, Rfl: 0    ondansetron (ZOFRAN) 4 mg tablet, Take 1 tablet (4 mg total) by mouth every 8 (eight) hours as needed for nausea or vomiting (Patient not taking: Reported on 12/10/2023), Disp: 20 tablet, Rfl: 0    Current Allergies     Allergies as of 12/10/2023    (No Known Allergies)            The following portions of the patient's history were reviewed and updated as appropriate: allergies, current medications, past family history, past medical history, past social history, past surgical history and problem list.     Past Medical History:   Diagnosis Date    Abnormal Pap smear of cervix 2015    Colpo; f/u paps normal    Hx of migraine headaches     Miscarriage        Past Surgical History:   Procedure Laterality Date    DILATION AND CURETTAGE OF UTERUS N/A 10/11/2021    Procedure: SUCTION DILATATION AND CURETTAGE (D&C);   Surgeon: Jo Smith MD;  Location: [facility] OR;  Service: Gynecology    NO PAST SURGERIES         Family History   Problem Relation Age of Onset    Diabetes Father     Cancer Maternal Grandmother     Thyroid disease Maternal Grandmother     Cancer Maternal Grandfather          Medications have been verified. Objective   /74 (BP Location: Right arm, Patient Position: Sitting)   Pulse 77   Temp 97.5 °F (36.4 °C) (Tympanic)   Resp 18   Ht 5' 3" (1.6 m)   Wt 72.7 kg (160 lb 3.2 oz)   SpO2 99%   BMI 28.38 kg/m²        Physical Exam     Physical Exam  Vitals reviewed. Constitutional:       General: She is not in acute distress. Appearance: Normal appearance. She is normal weight. She is not ill-appearing. HENT:      Head: Normocephalic. Nose: Nose normal.      Mouth/Throat:      Mouth: Mucous membranes are moist.   Eyes:      Conjunctiva/sclera: Conjunctivae normal.   Cardiovascular:      Rate and Rhythm: Normal rate and regular rhythm. Pulses: Normal pulses. Heart sounds: Normal heart sounds. No murmur heard. No friction rub. No gallop. Pulmonary:      Effort: Pulmonary effort is normal.      Breath sounds: Normal breath sounds. No wheezing, rhonchi or rales. Abdominal:      General: Abdomen is flat. Bowel sounds are normal. There is no distension. Palpations: Abdomen is soft. Tenderness: There is abdominal tenderness in the right lower quadrant and suprapubic area. There is no right CVA tenderness, left CVA tenderness or guarding. Skin:     General: Skin is warm. Findings: No rash. Neurological:      Mental Status: She is alert.       Comments: B/L LE strength 5/5   Psychiatric:         Mood and Affect: Mood normal.         Behavior: Behavior normal.

## 2023-12-12 LAB — BACTERIA UR CULT: NORMAL

## 2023-12-13 ENCOUNTER — OFFICE VISIT (OUTPATIENT)
Dept: OBGYN CLINIC | Facility: CLINIC | Age: 38
End: 2023-12-13
Payer: COMMERCIAL

## 2023-12-13 VITALS
HEIGHT: 63 IN | WEIGHT: 159.2 LBS | SYSTOLIC BLOOD PRESSURE: 122 MMHG | BODY MASS INDEX: 28.21 KG/M2 | DIASTOLIC BLOOD PRESSURE: 74 MMHG

## 2023-12-13 DIAGNOSIS — N94.9 FEMALE GENITAL SYMPTOMS: Primary | ICD-10-CM

## 2023-12-13 DIAGNOSIS — R10.2 PELVIC CRAMPING: ICD-10-CM

## 2023-12-13 PROCEDURE — 87480 CANDIDA DNA DIR PROBE: CPT | Performed by: NURSE PRACTITIONER

## 2023-12-13 PROCEDURE — 87491 CHLMYD TRACH DNA AMP PROBE: CPT | Performed by: NURSE PRACTITIONER

## 2023-12-13 PROCEDURE — 87510 GARDNER VAG DNA DIR PROBE: CPT | Performed by: NURSE PRACTITIONER

## 2023-12-13 PROCEDURE — 87591 N.GONORRHOEAE DNA AMP PROB: CPT | Performed by: NURSE PRACTITIONER

## 2023-12-13 PROCEDURE — 87660 TRICHOMONAS VAGIN DIR PROBE: CPT | Performed by: NURSE PRACTITIONER

## 2023-12-13 PROCEDURE — 99213 OFFICE O/P EST LOW 20 MIN: CPT | Performed by: NURSE PRACTITIONER

## 2023-12-13 RX ORDER — METRONIDAZOLE 500 MG/1
TABLET ORAL
COMMUNITY
Start: 2023-10-25 | End: 2023-12-13

## 2023-12-13 NOTE — PROGRESS NOTES
Assessment/Plan:    1. Female genital symptoms    - VAGINOSIS DNA PROBE; Future  - Chlamydia/GC amplified DNA by PCR    2. Pelvic cramping    - VAGINOSIS DNA PROBE; Future  - Chlamydia/GC amplified DNA by PCR    Advised to d/c antibiotic since the urine culture was negative  Urine HCG negative  Collected vaginal and G/C cultures to rule out infection. If cultures are negative and if pelvic pain is ongoing, will plan to check a pelvic US. Subjective:      Patient ID: Arley Slater is a 41 y.o. female. HPI  PROBLEM VISIT  CC: pelvic cramping/ genital symptoms    42 yo   Cramping in suprapubic area, pressure in vaginal area for the past week. LMP was  and was shorter and lighter than usual, today is CD 24. She was seen 3 days ago, on 12/10/23, at Urgent Care for same-- abdominal pain/cramps. They checked a UA which was + for leukocytes/+ blood and she was prescribed nitrofurantoin. Urine culture was ultimately negative. 2023 pap/HPV negative  STD hx: remote hx, does not recall what she had but recalls that she was treated. She is sexually active, same partner. Not using contraception. The following portions of the patient's history were reviewed and updated as appropriate: allergies, current medications, past family history, past medical history, past social history, past surgical history, and problem list.    Review of Systems   Constitutional:  Negative for chills and fever. Gastrointestinal:  Positive for nausea (x one week). Negative for constipation, diarrhea and vomiting. Genitourinary:  Positive for pelvic pain (cramps). Negative for difficulty urinating, dysuria, frequency, genital sores, menstrual problem, urgency, vaginal bleeding and vaginal discharge. Denies itching, burning, foul odor.          Objective:    /74 (BP Location: Right arm, Patient Position: Sitting, Cuff Size: Standard)   Ht 5' 3" (1.6 m)   Wt 72.2 kg (159 lb 3.2 oz)   LMP 2023 (Approximate)   BMI 28.20 kg/m²      Physical Exam  Constitutional:       General: She is not in acute distress. Appearance: Normal appearance. She is well-developed. She is not ill-appearing or diaphoretic. Comments: Bmi 28.2   HENT:      Head: Normocephalic and atraumatic. Eyes:      Pupils: Pupils are equal, round, and reactive to light. Pulmonary:      Effort: Pulmonary effort is normal.   Abdominal:      General: Abdomen is flat. Palpations: Abdomen is soft. Genitourinary:     General: Normal vulva. Exam position: Lithotomy position. Labia:         Right: No rash, tenderness, lesion or injury. Left: No rash, tenderness, lesion or injury. Vagina: No signs of injury and foreign body. No vaginal discharge, erythema, tenderness or bleeding. Cervix: No cervical motion tenderness, discharge or friability. Uterus: Not enlarged and not tender. Adnexa:         Right: No mass or tenderness. Left: No mass or tenderness. Comments: Mild discomfort suprapubically  Skin:     General: Skin is warm and dry. Neurological:      General: No focal deficit present. Mental Status: She is alert and oriented to person, place, and time. Psychiatric:         Mood and Affect: Mood normal.         Behavior: Behavior normal.         Thought Content:  Thought content normal.         Judgment: Judgment normal.

## 2023-12-14 LAB
C TRACH DNA SPEC QL NAA+PROBE: NEGATIVE
CANDIDA RRNA VAG QL PROBE: NOT DETECTED
G VAGINALIS RRNA GENITAL QL PROBE: NOT DETECTED
N GONORRHOEA DNA SPEC QL NAA+PROBE: NEGATIVE
T VAGINALIS RRNA GENITAL QL PROBE: NOT DETECTED

## 2023-12-14 NOTE — RESULT ENCOUNTER NOTE
Vaginal culture negative for bacteria, yeast or trichomonas. Gonorrhea and chlamydia cultures were negative.

## 2024-05-28 ENCOUNTER — HOSPITAL ENCOUNTER (EMERGENCY)
Facility: HOSPITAL | Age: 39
Discharge: HOME | End: 2024-05-28
Attending: EMERGENCY MEDICINE
Payer: COMMERCIAL

## 2024-05-28 VITALS
BODY MASS INDEX: 29.48 KG/M2 | DIASTOLIC BLOOD PRESSURE: 79 MMHG | SYSTOLIC BLOOD PRESSURE: 119 MMHG | OXYGEN SATURATION: 100 % | TEMPERATURE: 99.4 F | HEART RATE: 98 BPM | RESPIRATION RATE: 18 BRPM | WEIGHT: 166.45 LBS

## 2024-05-28 DIAGNOSIS — U07.1 COVID-19: Primary | ICD-10-CM

## 2024-05-28 LAB
ALBUMIN SERPL BCP-MCNC: 4.1 G/DL (ref 3.5–5)
ALP SERPL-CCNC: 45 U/L (ref 34–104)
ALT SERPL W P-5'-P-CCNC: 11 U/L (ref 7–52)
ANION GAP SERPL CALCULATED.3IONS-SCNC: 5 MMOL/L (ref 4–13)
AST SERPL W P-5'-P-CCNC: 15 U/L (ref 13–39)
ATRIAL RATE: 107 BPM
BASOPHILS # BLD AUTO: 0.04 THOUSANDS/ÂΜL (ref 0–0.1)
BASOPHILS NFR BLD AUTO: 1 % (ref 0–1)
BILIRUB SERPL-MCNC: 0.62 MG/DL (ref 0.2–1)
BUN SERPL-MCNC: 10 MG/DL (ref 5–25)
CALCIUM SERPL-MCNC: 8.6 MG/DL (ref 8.4–10.2)
CHLORIDE SERPL-SCNC: 106 MMOL/L (ref 96–108)
CO2 SERPL-SCNC: 23 MMOL/L (ref 21–32)
CREAT SERPL-MCNC: 0.8 MG/DL (ref 0.6–1.3)
EOSINOPHIL # BLD AUTO: 0.03 THOUSAND/ÂΜL (ref 0–0.61)
EOSINOPHIL NFR BLD AUTO: 1 % (ref 0–6)
ERYTHROCYTE [DISTWIDTH] IN BLOOD BY AUTOMATED COUNT: 12.6 % (ref 11.6–15.1)
FLUAV RNA RESP QL NAA+PROBE: NEGATIVE
FLUBV RNA RESP QL NAA+PROBE: NEGATIVE
GFR SERPL CREATININE-BSD FRML MDRD: 95 ML/MIN/1.73SQ M
GLUCOSE SERPL-MCNC: 93 MG/DL (ref 65–140)
HCT VFR BLD AUTO: 39 % (ref 34.8–46.1)
HGB BLD-MCNC: 13 G/DL (ref 11.5–15.4)
IMM GRANULOCYTES # BLD AUTO: 0.02 THOUSAND/UL (ref 0–0.2)
IMM GRANULOCYTES NFR BLD AUTO: 0 % (ref 0–2)
LYMPHOCYTES # BLD AUTO: 0.29 THOUSANDS/ÂΜL (ref 0.6–4.47)
LYMPHOCYTES NFR BLD AUTO: 6 % (ref 14–44)
MCH RBC QN AUTO: 28.7 PG (ref 26.8–34.3)
MCHC RBC AUTO-ENTMCNC: 33.3 G/DL (ref 31.4–37.4)
MCV RBC AUTO: 86 FL (ref 82–98)
MONOCYTES # BLD AUTO: 0.41 THOUSAND/ÂΜL (ref 0.17–1.22)
MONOCYTES NFR BLD AUTO: 9 % (ref 4–12)
NEUTROPHILS # BLD AUTO: 4 THOUSANDS/ÂΜL (ref 1.85–7.62)
NEUTS SEG NFR BLD AUTO: 83 % (ref 43–75)
NRBC BLD AUTO-RTO: 0 /100 WBCS
P AXIS: 68 DEGREES
PLATELET # BLD AUTO: 205 THOUSANDS/UL (ref 149–390)
PMV BLD AUTO: 10.1 FL (ref 8.9–12.7)
POTASSIUM SERPL-SCNC: 4 MMOL/L (ref 3.5–5.3)
PR INTERVAL: 178 MS
PROT SERPL-MCNC: 7.1 G/DL (ref 6.4–8.4)
QRS AXIS: 91 DEGREES
QRSD INTERVAL: 72 MS
QT INTERVAL: 302 MS
QTC INTERVAL: 403 MS
RBC # BLD AUTO: 4.53 MILLION/UL (ref 3.81–5.12)
RSV RNA RESP QL NAA+PROBE: NEGATIVE
SARS-COV-2 RNA RESP QL NAA+PROBE: POSITIVE
SODIUM SERPL-SCNC: 134 MMOL/L (ref 135–147)
T WAVE AXIS: 60 DEGREES
VENTRICULAR RATE: 107 BPM
WBC # BLD AUTO: 4.79 THOUSAND/UL (ref 4.31–10.16)

## 2024-05-28 PROCEDURE — 85025 COMPLETE CBC W/AUTO DIFF WBC: CPT

## 2024-05-28 PROCEDURE — 80053 COMPREHEN METABOLIC PANEL: CPT

## 2024-05-28 PROCEDURE — 99284 EMERGENCY DEPT VISIT MOD MDM: CPT

## 2024-05-28 PROCEDURE — 0241U HB NFCT DS VIR RESP RNA 4 TRGT: CPT

## 2024-05-28 PROCEDURE — 96368 THER/DIAG CONCURRENT INF: CPT

## 2024-05-28 PROCEDURE — 93010 ELECTROCARDIOGRAM REPORT: CPT | Performed by: STUDENT IN AN ORGANIZED HEALTH CARE EDUCATION/TRAINING PROGRAM

## 2024-05-28 PROCEDURE — 93005 ELECTROCARDIOGRAM TRACING: CPT

## 2024-05-28 PROCEDURE — 96365 THER/PROPH/DIAG IV INF INIT: CPT

## 2024-05-28 PROCEDURE — 36415 COLL VENOUS BLD VENIPUNCTURE: CPT

## 2024-05-28 PROCEDURE — 96375 TX/PRO/DX INJ NEW DRUG ADDON: CPT

## 2024-05-28 RX ORDER — ACETAMINOPHEN 325 MG/1
650 TABLET ORAL ONCE
Status: COMPLETED | OUTPATIENT
Start: 2024-05-28 | End: 2024-05-28

## 2024-05-28 RX ORDER — DIPHENHYDRAMINE HYDROCHLORIDE 50 MG/ML
25 INJECTION INTRAMUSCULAR; INTRAVENOUS ONCE
Status: COMPLETED | OUTPATIENT
Start: 2024-05-28 | End: 2024-05-28

## 2024-05-28 RX ORDER — METOCLOPRAMIDE HYDROCHLORIDE 5 MG/ML
10 INJECTION INTRAMUSCULAR; INTRAVENOUS ONCE
Status: COMPLETED | OUTPATIENT
Start: 2024-05-28 | End: 2024-05-28

## 2024-05-28 RX ORDER — MAGNESIUM SULFATE HEPTAHYDRATE 40 MG/ML
2 INJECTION, SOLUTION INTRAVENOUS ONCE
Status: COMPLETED | OUTPATIENT
Start: 2024-05-28 | End: 2024-05-28

## 2024-05-28 RX ADMIN — DIPHENHYDRAMINE HYDROCHLORIDE 25 MG: 50 INJECTION, SOLUTION INTRAMUSCULAR; INTRAVENOUS at 11:06

## 2024-05-28 RX ADMIN — SODIUM CHLORIDE, SODIUM LACTATE, POTASSIUM CHLORIDE, AND CALCIUM CHLORIDE 1000 ML: .6; .31; .03; .02 INJECTION, SOLUTION INTRAVENOUS at 11:04

## 2024-05-28 RX ADMIN — METOCLOPRAMIDE 10 MG: 5 INJECTION, SOLUTION INTRAMUSCULAR; INTRAVENOUS at 11:07

## 2024-05-28 RX ADMIN — MAGNESIUM SULFATE HEPTAHYDRATE 2 G: 40 INJECTION, SOLUTION INTRAVENOUS at 11:11

## 2024-05-28 RX ADMIN — ACETAMINOPHEN 650 MG: 325 TABLET, FILM COATED ORAL at 10:53

## 2024-05-28 NOTE — ED PROVIDER NOTES
History  Chief Complaint   Patient presents with    Dizziness     Pt c/o dizziness with lower back pain and nausea. Pt denies cp/sob/v/d or fevers     38 YOF with PMH migraines presents today with headache, nausea, off balance, lower back pain. Pt reports her headache started last night and it is in the frontal aspect. Reports this morning she feels very nauseous and off balance. States with her migraines it is typically in the same spot but does not have the off balance or nausea with them. Pt reports lower back pain as well. Denies urinary symptoms, vaginal discharge, vaginal bleeding, vomiting, diarrhea, constipation.         None       Past Medical History:   Diagnosis Date    Abnormal Pap smear of cervix 2015    Colpo; f/u paps normal; 9/2023 pap/hpv negative    Hx of migraine headaches     Miscarriage        Past Surgical History:   Procedure Laterality Date    DILATION AND CURETTAGE OF UTERUS N/A 10/11/2021    Procedure: SUCTION DILATATION AND CURETTAGE (D&C);  Surgeon: Kia Alonzo MD;  Location: [facility] OR;  Service: Gynecology       Family History   Problem Relation Age of Onset    Colon cancer Father 63    Diabetes Father     Cancer Maternal Grandmother     Thyroid disease Maternal Grandmother     Cancer Maternal Grandfather     Ovarian cancer Maternal Aunt     Breast cancer Neg Hx     Uterine cancer Neg Hx      I have reviewed and agree with the history as documented.    E-Cigarette/Vaping    E-Cigarette Use Never User      E-Cigarette/Vaping Substances    Nicotine No     THC No     CBD No     Flavoring No     Other No     Unknown No      Social History     Tobacco Use    Smoking status: Never    Smokeless tobacco: Never   Vaping Use    Vaping status: Never Used   Substance Use Topics    Alcohol use: Yes     Comment: occasional    Drug use: No       Review of Systems   Constitutional:  Negative for chills and fever.   Gastrointestinal:  Positive for nausea. Negative for abdominal pain, constipation,  diarrhea and vomiting.   Genitourinary:  Negative for decreased urine volume, dysuria, frequency, hematuria, urgency, vaginal bleeding and vaginal discharge.   Musculoskeletal:  Positive for back pain. Negative for neck pain and neck stiffness.   Neurological:  Positive for dizziness and headaches.   All other systems reviewed and are negative.      Physical Exam  Physical Exam  Vitals and nursing note reviewed.   Constitutional:       General: She is not in acute distress.     Appearance: Normal appearance. She is well-developed. She is not ill-appearing.   HENT:      Head: Normocephalic and atraumatic.   Eyes:      Conjunctiva/sclera: Conjunctivae normal.   Cardiovascular:      Rate and Rhythm: Normal rate.   Pulmonary:      Effort: Pulmonary effort is normal.   Abdominal:      Palpations: Abdomen is soft.      Tenderness: There is no abdominal tenderness.   Musculoskeletal:         General: No swelling. Normal range of motion.      Cervical back: Normal range of motion and neck supple.   Skin:     General: Skin is warm and dry.   Neurological:      General: No focal deficit present.      Mental Status: She is alert and oriented to person, place, and time.   Psychiatric:         Mood and Affect: Mood normal.         Vital Signs  ED Triage Vitals   Temperature Pulse Respirations Blood Pressure SpO2   05/28/24 1118 05/28/24 1032 05/28/24 1032 05/28/24 1032 05/28/24 1032   99.4 °F (37.4 °C) (!) 115 18 113/71 100 %      Temp Source Heart Rate Source Patient Position - Orthostatic VS BP Location FiO2 (%)   05/28/24 1118 05/28/24 1032 05/28/24 1032 05/28/24 1032 --   Oral Monitor Lying Right arm       Pain Score       05/28/24 1032       8           Vitals:    05/28/24 1032   BP: 113/71   Pulse: (!) 115   Patient Position - Orthostatic VS: Lying         Visual Acuity      ED Medications  Medications   lactated ringers bolus 1,000 mL (1,000 mL Intravenous New Bag 5/28/24 1104)   acetaminophen (TYLENOL) tablet 650 mg (650  mg Oral Given 5/28/24 1053)   metoclopramide (REGLAN) injection 10 mg (10 mg Intravenous Given 5/28/24 1107)   diphenhydrAMINE (BENADRYL) injection 25 mg (25 mg Intravenous Given 5/28/24 1106)   magnesium sulfate 2 g/50 mL IVPB (premix) 2 g (0 g Intravenous Stopped 5/28/24 1207)       Diagnostic Studies  Results Reviewed       Procedure Component Value Units Date/Time    FLU/RSV/COVID - if FLU/RSV clinically relevant [[id number]]  (Abnormal) Collected: 05/28/24 1115    Lab Status: Final result Specimen: Nares from Nose Updated: 05/28/24 1204     SARS-CoV-2 Positive     INFLUENZA A PCR Negative     INFLUENZA B PCR Negative     RSV PCR Negative    Narrative:      FOR PEDIATRIC PATIENTS - copy/paste COVID Guidelines URL to browser: [email or web address]    SARS-CoV-2 assay is a Nucleic Acid Amplification assay intended for the  qualitative detection of nucleic acid from SARS-CoV-2 in nasopharyngeal  swabs. Results are for the presumptive identification of SARS-CoV-2 RNA.    Positive results are indicative of infection with SARS-CoV-2, the virus  causing COVID-19, but do not rule out bacterial infection or co-infection  with other viruses. Laboratories within the [place] and its  territories are required to report all positive results to the appropriate  public health authorities. Negative results do not preclude SARS-CoV-2  infection and should not be used as the sole basis for treatment or other  patient management decisions. Negative results must be combined with  clinical observations, patient history, and epidemiological information.  This test has not been FDA cleared or approved.    This test has been authorized by FDA under an Emergency Use Authorization  (EUA). This test is only authorized for the duration of time the  declaration that circumstances exist justifying the authorization of the  emergency use of an in vitro diagnostic tests for detection of  SARS-CoV-2  virus and/or diagnosis of COVID-19 infection under section 564(b)(1) of  the Act, 21 U.S.C. 360bbb-3(b)(1), unless the authorization is terminated  or revoked sooner. The test has been validated but independent review by FDA  and CLIA is pending.    Test performed using [employer] GeneXpert: This RT-PCR assay targets N2,  a region unique to SARS-CoV-2. A conserved region in the E-gene was chosen  for pan-Sarbecovirus detection which includes SARS-CoV-2.    According to CMS-2020-01-R, this platform meets the definition of high-throughput technology.    Comprehensive metabolic panel [[id number]]  (Abnormal) Collected: 05/28/24 1110    Lab Status: Final result Specimen: Blood from Arm, Left Updated: 05/28/24 1132     Sodium 134 mmol/L      Potassium 4.0 mmol/L      Chloride 106 mmol/L      CO2 23 mmol/L      ANION GAP 5 mmol/L      BUN 10 mg/dL      Creatinine 0.80 mg/dL      Glucose 93 mg/dL      Calcium 8.6 mg/dL      AST 15 U/L      ALT 11 U/L      Alkaline Phosphatase 45 U/L      Total Protein 7.1 g/dL      Albumin 4.1 g/dL      Total Bilirubin 0.62 mg/dL      eGFR 95 ml/min/1.73sq m     Narrative:      National Kidney Disease Foundation guidelines for Chronic Kidney Disease (CKD):     Stage 1 with normal or high GFR (GFR > 90 mL/min/1.73 square meters)    Stage 2 Mild CKD (GFR = 60-89 mL/min/1.73 square meters)    Stage 3A Moderate CKD (GFR = 45-59 mL/min/1.73 square meters)    Stage 3B Moderate CKD (GFR = 30-44 mL/min/1.73 square meters)    Stage 4 Severe CKD (GFR = 15-29 mL/min/1.73 square meters)    Stage 5 End Stage CKD (GFR <15 mL/min/1.73 square meters)  Note: GFR calculation is accurate only with a steady state creatinine    CBC and differential [[id number]]  (Abnormal) Collected: 05/28/24 1110    Lab Status: Final result Specimen: Blood from Arm, Left Updated: 05/28/24 1117     WBC 4.79 Thousand/uL      RBC 4.53 Million/uL      Hemoglobin 13.0 g/dL      Hematocrit 39.0 %      MCV 86 fL      MCH 28.7  pg      MCHC 33.3 g/dL      RDW 12.6 %      MPV 10.1 fL      Platelets 205 Thousands/uL      nRBC 0 /100 WBCs      Segmented % 83 %      Immature Grans % 0 %      Lymphocytes % 6 %      Monocytes % 9 %      Eosinophils Relative 1 %      Basophils Relative 1 %      Absolute Neutrophils 4.00 Thousands/µL      Absolute Immature Grans 0.02 Thousand/uL      Absolute Lymphocytes 0.29 Thousands/µL      Absolute Monocytes 0.41 Thousand/µL      Eosinophils Absolute 0.03 Thousand/µL      Basophils Absolute 0.04 Thousands/µL     POCT pregnancy, urine [[id number]]     Lab Status: No result                    No orders to display              Procedures  ECG 12 Lead Documentation Only    Date/Time: 5/28/2024 10:49 AM    Performed by: Romi Chamberlain PA-C  Authorized by: Romi Chamberlain PA-C    Indications / Diagnosis:  Dizziness  ECG reviewed by me, the ED Provider: yes (and Dr. Tong)    Patient location:  ED  Previous ECG:     Previous ECG:  Unavailable  Interpretation:     Interpretation: normal    Rate:     ECG rate:  107    ECG rate assessment: tachycardic    Rhythm:     Rhythm: sinus rhythm    Ectopy:     Ectopy: none    QRS:     QRS axis:  Normal    QRS intervals:  Normal  Conduction:     Conduction: normal    ST segments:     ST segments:  Normal  T waves:     T waves: normal             ED Course  ED Course as of 05/28/24 1217   Tue May 28, 2024   1210 SARS-COV-2(!): Positive                                             Medical Decision Making  Pt tested positive for Covid 19. Would explain her symptoms. Symptoms resolved after medication here.     I have discussed the plan to discharge pt from ED. The patient was discharged in stable condition.  Patient ambulated off the department.  Extensive return to emergency department precautions were discussed.  Follow up with appropriate providers including primary care physician was discussed.  Patient and/or their  primary decision maker expressed understanding.   Patient remained stable during entire emergency department stay.      Amount and/or Complexity of Data Reviewed  Labs: ordered. Decision-making details documented in ED Course.    Risk  OTC drugs.  Prescription drug management.             Disposition  Final diagnoses:   COVID-19     Time reflects when diagnosis was documented in both MDM as applicable and the Disposition within this note       Time User Action Codes Description Comment    5/28/2024 12:10 PM Romi Chamberlain Add [U07.1] COVID-19           ED Disposition       ED Disposition   Discharge    Condition   Stable    Date/Time   Tue May 28, 2024 12:10 PM    Comment   Pao Aguilar discharge to home/self care.                   Follow-up Information    None         Patient's Medications   Discharge Prescriptions    No medications on file       No discharge procedures on file.    PDMP Review       None            ED Provider  Electronically Signed by             Romi Chamberlain PA-C  05/28/24 3981

## (undated) DEVICE — DRAPE EQUIPMENT RF WAND

## (undated) DEVICE — BETHLEHEM UNIVERSAL MINOR VAG: Brand: CARDINAL HEALTH

## (undated) DEVICE — PREMIUM DRY TRAY LF: Brand: MEDLINE INDUSTRIES, INC.

## (undated) DEVICE — GLOVE PI ULTRA TOUCH SZ.7.0

## (undated) DEVICE — PVC URETHRAL CATHETER: Brand: DOVER

## (undated) DEVICE — GLOVE INDICATOR PI UNDERGLOVE SZ 7.5 BLUE

## (undated) DEVICE — SCD SEQUENTIAL COMPRESSION COMFORT SLEEVE MEDIUM KNEE LENGTH: Brand: KENDALL SCD

## (undated) DEVICE — D + E CONNECTION HOSE

## (undated) DEVICE — CURETTE VACURETTE CRVD 8MM